# Patient Record
Sex: MALE | Race: WHITE | NOT HISPANIC OR LATINO | Employment: FULL TIME | URBAN - METROPOLITAN AREA
[De-identification: names, ages, dates, MRNs, and addresses within clinical notes are randomized per-mention and may not be internally consistent; named-entity substitution may affect disease eponyms.]

---

## 2017-02-15 ENCOUNTER — CONVERSION ENCOUNTER (OUTPATIENT)
Dept: FAMILY MEDICINE CLINIC | Facility: CLINIC | Age: 52
End: 2017-02-15

## 2017-02-15 ENCOUNTER — CONVERSION ENCOUNTER (OUTPATIENT)
Dept: OTHER | Facility: OTHER | Age: 52
End: 2017-02-15

## 2017-02-16 LAB
ALBUMIN SERPL-MCNC: 4.4 G/DL (ref 3.5–5.5)
ALBUMIN/GLOB SERPL: 1.7 {RATIO} (ref 1.1–2.5)
ALP SERPL-CCNC: 94 IU/L (ref 39–117)
ALT SERPL-CCNC: 79 IU/L (ref 0–44)
AST SERPL-CCNC: 50 IU/L (ref 0–40)
BILIRUB SERPL-MCNC: 0.6 MG/DL (ref 0–1.2)
BUN SERPL-MCNC: 16 MG/DL (ref 6–24)
BUN/CREAT SERPL: 14 (ref 9–20)
CALCIUM SERPL-MCNC: 9.3 MG/DL (ref 8.7–10.2)
CHLORIDE SERPL-SCNC: 103 MMOL/L (ref 96–106)
CHOLEST SERPL-MCNC: 175 MG/DL (ref 100–199)
CO2 SERPL-SCNC: 22 MMOL/L (ref 18–29)
CREAT SERPL-MCNC: 1.14 MG/DL (ref 0.76–1.27)
GLOBULIN SER CALC-MCNC: 2.6 G/DL (ref 1.5–4.5)
GLUCOSE SERPL-MCNC: 91 MG/DL (ref 65–99)
HDLC SERPL-MCNC: 43 MG/DL
LDLC SERPL CALC-MCNC: 111 MG/DL (ref 0–99)
POTASSIUM SERPL-SCNC: 4.7 MMOL/L (ref 3.5–5.2)
PROT SERPL-MCNC: 7 G/DL (ref 6–8.5)
SODIUM SERPL-SCNC: 142 MMOL/L (ref 134–144)
T4 FREE SERPL-MCNC: 1.27 NG/DL (ref 0.82–1.77)
TRIGL SERPL-MCNC: 107 MG/DL (ref 0–149)
TSH SERPL DL<=0.005 MIU/L-ACNC: 2.31 UIU/ML (ref 0.45–4.5)
VLDLC SERPL CALC-MCNC: 21 MG/DL (ref 5–40)

## 2017-02-17 ENCOUNTER — OFFICE VISIT (OUTPATIENT)
Dept: FAMILY MEDICINE CLINIC | Facility: CLINIC | Age: 52
End: 2017-02-17

## 2017-02-17 VITALS
SYSTOLIC BLOOD PRESSURE: 120 MMHG | BODY MASS INDEX: 32.98 KG/M2 | OXYGEN SATURATION: 98 % | HEIGHT: 74 IN | WEIGHT: 257 LBS | HEART RATE: 61 BPM | DIASTOLIC BLOOD PRESSURE: 70 MMHG | TEMPERATURE: 98.4 F | RESPIRATION RATE: 16 BRPM

## 2017-02-17 DIAGNOSIS — F33.42 RECURRENT MAJOR DEPRESSIVE DISORDER, IN FULL REMISSION (HCC): ICD-10-CM

## 2017-02-17 DIAGNOSIS — E03.9 ACQUIRED HYPOTHYROIDISM: Primary | ICD-10-CM

## 2017-02-17 DIAGNOSIS — F41.9 ANXIETY: ICD-10-CM

## 2017-02-17 DIAGNOSIS — E78.2 MIXED HYPERLIPIDEMIA: ICD-10-CM

## 2017-02-17 DIAGNOSIS — R79.89 LFT ELEVATION: ICD-10-CM

## 2017-02-17 PROCEDURE — 99214 OFFICE O/P EST MOD 30 MIN: CPT | Performed by: PHYSICIAN ASSISTANT

## 2017-02-17 RX ORDER — EZETIMIBE 10 MG/1
10 TABLET ORAL DAILY
Qty: 90 TABLET | Refills: 3 | Status: SHIPPED | OUTPATIENT
Start: 2017-02-17 | End: 2018-06-20 | Stop reason: SDUPTHER

## 2017-02-17 NOTE — PROGRESS NOTES
Subjective   Reynaldo Madden is a 51 y.o. male.     History of Present Illness   Reynaldo Madden 51 y.o. male who presents today for routine follow up check and medication refills.  he has a history of   Patient Active Problem List   Diagnosis   • Allergy   • Depression   • Erectile dysfunction   • Anxiety   • Hyperlipidemia   • Hypothyroidism   • Vitamin D deficiency disease   • SAMANTHA (generalized anxiety disorder)   .  Since the last visit, he has overall felt well.  He has hypothyroidism and is well controlled on Rx.  Labs are in desired treatment range.  he has been compliant with current medications have reviewed them.  The patient denies medication side effects.  TSH at goal 2.310 and free T4 1.27    LDL is 111 and goal is <100; need to add Zetia  CMP with normal renal functions but LFTs went up to ALT 79 and AST 50 and will order liver u/s.  His weight is down    Reynaldo Madden male 51 y.o. who presents today for follow up of Depression and Anxiety.  He reports medication is working well, patient desires to continue on Rx, and needs refill. Onset of symptoms was approximately several years ago.  He denies current suicidal and homicidal ideation. Risk factors are family history of anxiety and or depression and lifestyle of multiple roles.  Previous treatment includes current Rx.  He complains of the following medication side effects: none.  The patient has previously been in counseling..      The following portions of the patient's history were reviewed and updated as appropriate: allergies, current medications, past family history, past medical history, past social history, past surgical history and problem list.    Review of Systems   Constitutional: Negative for activity change, appetite change and unexpected weight change.   HENT: Negative for nosebleeds and trouble swallowing.    Eyes: Negative for pain and visual disturbance.   Respiratory: Negative for chest tightness, shortness of breath and wheezing.     Cardiovascular: Negative for chest pain and palpitations.   Gastrointestinal: Negative for abdominal pain and blood in stool.   Endocrine: Negative.    Genitourinary: Negative for difficulty urinating and hematuria.   Musculoskeletal: Negative for joint swelling.   Skin: Negative for color change and rash.   Allergic/Immunologic: Negative.    Neurological: Negative for syncope and speech difficulty.   Hematological: Negative for adenopathy.   Psychiatric/Behavioral: Negative for agitation and confusion.   All other systems reviewed and are negative.      Objective   Physical Exam   Constitutional: He is oriented to person, place, and time. He appears well-developed and well-nourished. No distress.   HENT:   Head: Normocephalic and atraumatic.   Eyes: Conjunctivae and EOM are normal. Pupils are equal, round, and reactive to light. Right eye exhibits no discharge. Left eye exhibits no discharge. No scleral icterus.   Neck: Normal range of motion. Neck supple. No tracheal deviation present. No thyromegaly present.   Cardiovascular: Normal rate, regular rhythm, normal heart sounds, intact distal pulses and normal pulses.  Exam reveals no gallop.    No murmur heard.  Pulmonary/Chest: Effort normal and breath sounds normal. No respiratory distress. He has no wheezes. He has no rales.   Musculoskeletal: Normal range of motion.   Neurological: He is alert and oriented to person, place, and time. He exhibits normal muscle tone. Coordination normal.   Skin: Skin is warm. No rash noted. No erythema. No pallor.   Psychiatric: He has a normal mood and affect. His behavior is normal. Judgment and thought content normal.   Nursing note and vitals reviewed.      Assessment/Plan   Reynaldo was seen today for hyperlipidemia.    Diagnoses and all orders for this visit:    Acquired hypothyroidism    Recurrent major depressive disorder, in full remission    Anxiety    Mixed hyperlipidemia    LFT elevation    Other orders  -      ezetimibe (ZETIA) 10 MG tablet; Take 1 tablet by mouth Daily. For cholesterol

## 2017-02-17 NOTE — PATIENT INSTRUCTIONS
Will add on acute hepatitis profile  Ordered liver ultrasound  Labs 3mos  Add Zetia for cholesterol  Contact office if your depression worsens   Go to ER if start to develop feelings for suicide  Take medication as prescribed  If you are having trouble tolerating your medication, contact office before abruptly stopping it

## 2017-02-21 LAB
HAV IGM SERPL QL IA: NEGATIVE
HBV CORE IGM SERPL QL IA: NEGATIVE
HBV SURFACE AG SERPL QL IA: NEGATIVE
HCV AB S/CO SERPL IA: <0.1 S/CO RATIO (ref 0–0.9)
WRITTEN AUTHORIZATION: NORMAL

## 2017-02-25 ENCOUNTER — HOSPITAL ENCOUNTER (OUTPATIENT)
Dept: ULTRASOUND IMAGING | Facility: HOSPITAL | Age: 52
Discharge: HOME | End: 2017-02-25
Admitting: PHYSICIAN ASSISTANT

## 2017-02-25 DIAGNOSIS — R79.89 LFT ELEVATION: ICD-10-CM

## 2017-02-25 PROCEDURE — 76705 ECHO EXAM OF ABDOMEN: CPT

## 2017-03-20 ENCOUNTER — TELEPHONE (OUTPATIENT)
Dept: FAMILY MEDICINE CLINIC | Facility: CLINIC | Age: 52
End: 2017-03-20

## 2017-03-20 DIAGNOSIS — E78.49 OTHER HYPERLIPIDEMIA: Primary | ICD-10-CM

## 2017-03-20 RX ORDER — ROSUVASTATIN CALCIUM 20 MG/1
TABLET, FILM COATED ORAL
Qty: 90 TABLET | OUTPATIENT
Start: 2017-03-20

## 2017-03-20 NOTE — TELEPHONE ENCOUNTER
Pt is calling inquiring if he should cont the cholesrerol medication with his elevated liver function and the u/s is normal. Please advice.

## 2017-05-16 ENCOUNTER — OFFICE (AMBULATORY)
Dept: URBAN - METROPOLITAN AREA CLINIC 75 | Facility: CLINIC | Age: 52
End: 2017-05-16

## 2017-05-16 VITALS
SYSTOLIC BLOOD PRESSURE: 130 MMHG | HEIGHT: 74 IN | DIASTOLIC BLOOD PRESSURE: 70 MMHG | WEIGHT: 256 LBS | HEART RATE: 64 BPM

## 2017-05-16 DIAGNOSIS — R94.5 ABNORMAL RESULTS OF LIVER FUNCTION STUDIES: ICD-10-CM

## 2017-05-16 DIAGNOSIS — Z12.11 ENCOUNTER FOR SCREENING FOR MALIGNANT NEOPLASM OF COLON: ICD-10-CM

## 2017-05-16 PROCEDURE — 99204 OFFICE O/P NEW MOD 45 MIN: CPT | Performed by: INTERNAL MEDICINE

## 2017-05-17 ENCOUNTER — RESULTS ENCOUNTER (OUTPATIENT)
Dept: FAMILY MEDICINE CLINIC | Facility: CLINIC | Age: 52
End: 2017-05-17

## 2017-05-17 DIAGNOSIS — E78.2 MIXED HYPERLIPIDEMIA: ICD-10-CM

## 2017-05-17 DIAGNOSIS — F41.9 ANXIETY: ICD-10-CM

## 2017-05-17 DIAGNOSIS — F33.42 RECURRENT MAJOR DEPRESSIVE DISORDER, IN FULL REMISSION (HCC): ICD-10-CM

## 2017-05-17 DIAGNOSIS — R79.89 LFT ELEVATION: ICD-10-CM

## 2017-05-17 DIAGNOSIS — E03.9 ACQUIRED HYPOTHYROIDISM: ICD-10-CM

## 2017-06-21 VITALS
HEART RATE: 56 BPM | HEART RATE: 55 BPM | SYSTOLIC BLOOD PRESSURE: 132 MMHG | SYSTOLIC BLOOD PRESSURE: 131 MMHG | RESPIRATION RATE: 18 BRPM | RESPIRATION RATE: 10 BRPM | DIASTOLIC BLOOD PRESSURE: 71 MMHG | HEART RATE: 52 BPM | HEART RATE: 57 BPM | HEART RATE: 70 BPM | DIASTOLIC BLOOD PRESSURE: 67 MMHG | DIASTOLIC BLOOD PRESSURE: 79 MMHG | OXYGEN SATURATION: 100 % | RESPIRATION RATE: 25 BRPM | SYSTOLIC BLOOD PRESSURE: 123 MMHG | OXYGEN SATURATION: 96 % | DIASTOLIC BLOOD PRESSURE: 76 MMHG | OXYGEN SATURATION: 95 % | TEMPERATURE: 96.7 F | RESPIRATION RATE: 27 BRPM | RESPIRATION RATE: 24 BRPM | SYSTOLIC BLOOD PRESSURE: 104 MMHG | DIASTOLIC BLOOD PRESSURE: 68 MMHG | HEIGHT: 74 IN | SYSTOLIC BLOOD PRESSURE: 120 MMHG | HEART RATE: 63 BPM | DIASTOLIC BLOOD PRESSURE: 75 MMHG | DIASTOLIC BLOOD PRESSURE: 73 MMHG | RESPIRATION RATE: 22 BRPM | OXYGEN SATURATION: 92 % | OXYGEN SATURATION: 98 % | RESPIRATION RATE: 16 BRPM | DIASTOLIC BLOOD PRESSURE: 77 MMHG | OXYGEN SATURATION: 97 % | SYSTOLIC BLOOD PRESSURE: 124 MMHG | HEART RATE: 61 BPM | SYSTOLIC BLOOD PRESSURE: 125 MMHG | SYSTOLIC BLOOD PRESSURE: 129 MMHG | HEART RATE: 64 BPM | RESPIRATION RATE: 14 BRPM | HEART RATE: 60 BPM | WEIGHT: 256 LBS | TEMPERATURE: 98 F | SYSTOLIC BLOOD PRESSURE: 107 MMHG | DIASTOLIC BLOOD PRESSURE: 63 MMHG

## 2017-06-22 ENCOUNTER — OFFICE (AMBULATORY)
Dept: URBAN - METROPOLITAN AREA CLINIC 64 | Facility: CLINIC | Age: 52
End: 2017-06-22

## 2017-06-22 ENCOUNTER — AMBULATORY SURGICAL CENTER (AMBULATORY)
Dept: URBAN - METROPOLITAN AREA SURGERY 17 | Facility: SURGERY | Age: 52
End: 2017-06-22
Payer: OTHER GOVERNMENT

## 2017-06-22 DIAGNOSIS — K63.5 POLYP OF COLON: ICD-10-CM

## 2017-06-22 DIAGNOSIS — Z12.11 ENCOUNTER FOR SCREENING FOR MALIGNANT NEOPLASM OF COLON: ICD-10-CM

## 2017-06-22 DIAGNOSIS — D12.4 BENIGN NEOPLASM OF DESCENDING COLON: ICD-10-CM

## 2017-06-22 LAB
GI HISTOLOGY: A. UNSPECIFIED: (no result)
GI HISTOLOGY: PDF REPORT: (no result)

## 2017-06-22 PROCEDURE — 45385 COLONOSCOPY W/LESION REMOVAL: CPT | Mod: PT | Performed by: INTERNAL MEDICINE

## 2017-06-22 PROCEDURE — 88305 TISSUE EXAM BY PATHOLOGIST: CPT | Performed by: INTERNAL MEDICINE

## 2017-06-22 RX ADMIN — PROPOFOL 50 MG: 10 INJECTION, EMULSION INTRAVENOUS at 11:31

## 2017-06-22 RX ADMIN — PROPOFOL 50 MG: 10 INJECTION, EMULSION INTRAVENOUS at 11:22

## 2017-06-22 RX ADMIN — LIDOCAINE HYDROCHLORIDE 25 MG: 10 INJECTION, SOLUTION EPIDURAL; INFILTRATION; INTRACAUDAL; PERINEURAL at 11:19

## 2017-06-22 RX ADMIN — PROPOFOL 50 MG: 10 INJECTION, EMULSION INTRAVENOUS at 11:28

## 2017-06-22 RX ADMIN — PROPOFOL 100 MG: 10 INJECTION, EMULSION INTRAVENOUS at 11:19

## 2017-06-22 RX ADMIN — PROPOFOL 50 MG: 10 INJECTION, EMULSION INTRAVENOUS at 11:33

## 2017-06-22 RX ADMIN — PROPOFOL 50 MG: 10 INJECTION, EMULSION INTRAVENOUS at 11:20

## 2017-07-18 ENCOUNTER — OFFICE (AMBULATORY)
Dept: URBAN - METROPOLITAN AREA CLINIC 75 | Facility: CLINIC | Age: 52
End: 2017-07-18

## 2017-07-18 VITALS
SYSTOLIC BLOOD PRESSURE: 120 MMHG | HEIGHT: 74 IN | HEART RATE: 80 BPM | WEIGHT: 260 LBS | DIASTOLIC BLOOD PRESSURE: 70 MMHG

## 2017-07-18 DIAGNOSIS — R94.5 ABNORMAL RESULTS OF LIVER FUNCTION STUDIES: ICD-10-CM

## 2017-07-18 PROCEDURE — 99213 OFFICE O/P EST LOW 20 MIN: CPT

## 2017-08-29 ENCOUNTER — OFFICE VISIT (OUTPATIENT)
Dept: FAMILY MEDICINE CLINIC | Facility: CLINIC | Age: 52
End: 2017-08-29

## 2017-08-29 VITALS
HEART RATE: 60 BPM | RESPIRATION RATE: 16 BRPM | SYSTOLIC BLOOD PRESSURE: 114 MMHG | OXYGEN SATURATION: 95 % | WEIGHT: 265 LBS | DIASTOLIC BLOOD PRESSURE: 72 MMHG | BODY MASS INDEX: 34.01 KG/M2 | HEIGHT: 74 IN | TEMPERATURE: 97.8 F

## 2017-08-29 DIAGNOSIS — F41.1 GAD (GENERALIZED ANXIETY DISORDER): ICD-10-CM

## 2017-08-29 DIAGNOSIS — E03.9 ACQUIRED HYPOTHYROIDISM: ICD-10-CM

## 2017-08-29 DIAGNOSIS — E55.9 VITAMIN D DEFICIENCY DISEASE: ICD-10-CM

## 2017-08-29 DIAGNOSIS — F33.42 RECURRENT MAJOR DEPRESSIVE DISORDER, IN FULL REMISSION (HCC): ICD-10-CM

## 2017-08-29 DIAGNOSIS — B00.1 FEVER BLISTER: ICD-10-CM

## 2017-08-29 DIAGNOSIS — E78.2 MIXED HYPERLIPIDEMIA: Primary | ICD-10-CM

## 2017-08-29 DIAGNOSIS — F41.9 ANXIETY: ICD-10-CM

## 2017-08-29 DIAGNOSIS — R79.89 LFT ELEVATION: ICD-10-CM

## 2017-08-29 LAB
ALBUMIN SERPL-MCNC: 4.2 G/DL (ref 3.5–5.5)
ALBUMIN/GLOB SERPL: 1.5 {RATIO} (ref 1.2–2.2)
ALP SERPL-CCNC: 74 IU/L (ref 39–117)
ALT SERPL-CCNC: 20 IU/L (ref 0–44)
AST SERPL-CCNC: 18 IU/L (ref 0–40)
BILIRUB SERPL-MCNC: 0.8 MG/DL (ref 0–1.2)
BUN SERPL-MCNC: 17 MG/DL (ref 6–24)
BUN/CREAT SERPL: 14 (ref 9–20)
CALCIUM SERPL-MCNC: 9.2 MG/DL (ref 8.7–10.2)
CHLORIDE SERPL-SCNC: 104 MMOL/L (ref 96–106)
CHOLEST SERPL-MCNC: 227 MG/DL (ref 100–199)
CO2 SERPL-SCNC: 21 MMOL/L (ref 18–29)
CREAT SERPL-MCNC: 1.24 MG/DL (ref 0.76–1.27)
GLOBULIN SER CALC-MCNC: 2.8 G/DL (ref 1.5–4.5)
GLUCOSE SERPL-MCNC: 91 MG/DL (ref 65–99)
HDLC SERPL-MCNC: 43 MG/DL
LDLC SERPL CALC-MCNC: 162 MG/DL (ref 0–99)
POTASSIUM SERPL-SCNC: 4.6 MMOL/L (ref 3.5–5.2)
PROT SERPL-MCNC: 7 G/DL (ref 6–8.5)
SODIUM SERPL-SCNC: 141 MMOL/L (ref 134–144)
TRIGL SERPL-MCNC: 110 MG/DL (ref 0–149)
VLDLC SERPL CALC-MCNC: 22 MG/DL (ref 5–40)

## 2017-08-29 PROCEDURE — 99213 OFFICE O/P EST LOW 20 MIN: CPT | Performed by: PHYSICIAN ASSISTANT

## 2017-08-29 RX ORDER — ESCITALOPRAM OXALATE 20 MG/1
20 TABLET ORAL DAILY
Qty: 90 TABLET | Refills: 3 | Status: SHIPPED | OUTPATIENT
Start: 2017-08-29 | End: 2018-10-11 | Stop reason: SDUPTHER

## 2017-08-29 RX ORDER — VALACYCLOVIR HYDROCHLORIDE 1 G/1
TABLET, FILM COATED ORAL
Qty: 20 TABLET | Refills: 5 | Status: SHIPPED | OUTPATIENT
Start: 2017-08-29 | End: 2019-01-24

## 2017-08-29 RX ORDER — LEVOTHYROXINE SODIUM 0.07 MG/1
75 TABLET ORAL DAILY
Qty: 90 TABLET | Refills: 3 | Status: SHIPPED | OUTPATIENT
Start: 2017-08-29 | End: 2018-07-24 | Stop reason: SDUPTHER

## 2017-08-29 NOTE — PROGRESS NOTES
Subjective   Reynaldo Madden is a 51 y.o. male.     History of Present Illness   Reynaldo Madden 51 y.o. male who presents today for routine follow up check and medication refills.  he has a history of   Patient Active Problem List   Diagnosis   • Allergy   • Depression   • Erectile dysfunction   • Anxiety   • Hyperlipidemia   • Hypothyroidism   • Vitamin D deficiency disease   • SAMANTHA (generalized anxiety disorder)   .  Since the last visit, he has overall felt tired.  He has Hyperlipidemia and here to discuss treatment and hypothyroidism and is well controlled on Rx.  Labs are in desired treatment range.  he has been compliant with current medications have reviewed them.  The patient denies medication side effects.    I had to stop Crestor and LFTs now normal;  Renal functions now in range  Lab Results   Component Value Date    TSH 2.310 02/15/2017     Reynaldo Madden male 51 y.o. who presents today for follow up of Depression and Anxiety.  He reports medication is working and just many recent life stresses. Onset of symptoms was approximately several years ago.  He denies current suicidal and homicidal ideation. Risk factors are family history of anxiety and or depression and lifestyle of multiple roles.  Previous treatment includes current Rx.  He complains of the following medication side effects: none.  The patient has previously been in counseling..    Less exercise and need to work on diet; weight is up  Lipids are up also    Dr Ladd did colonoscopy March and repeat 10 years  Fever blisters more often with stress and will Rx Valtrex    The following portions of the patient's history were reviewed and updated as appropriate: allergies, current medications, past family history, past medical history, past social history, past surgical history and problem list.    Review of Systems   Constitutional: Positive for fatigue and unexpected weight change. Negative for activity change and appetite change.   HENT: Negative  for nosebleeds and trouble swallowing.    Eyes: Negative for pain and visual disturbance.   Respiratory: Negative for chest tightness, shortness of breath and wheezing.    Cardiovascular: Negative for chest pain and palpitations.   Gastrointestinal: Negative for abdominal pain and blood in stool.   Endocrine: Negative.    Genitourinary: Negative for difficulty urinating and hematuria.   Musculoskeletal: Negative for joint swelling.   Skin: Negative for color change and rash.   Allergic/Immunologic: Negative.    Neurological: Negative for syncope and speech difficulty.   Hematological: Negative for adenopathy.   Psychiatric/Behavioral: Negative for agitation and confusion. The patient is nervous/anxious.    All other systems reviewed and are negative.      Objective   Physical Exam   Constitutional: He is oriented to person, place, and time. He appears well-developed and well-nourished. No distress.   HENT:   Head: Normocephalic and atraumatic.   Eyes: Conjunctivae and EOM are normal. Pupils are equal, round, and reactive to light. Right eye exhibits no discharge. Left eye exhibits no discharge. No scleral icterus.   Neck: Normal range of motion. Neck supple. No tracheal deviation present. No thyromegaly present.   Cardiovascular: Normal rate, regular rhythm, normal heart sounds, intact distal pulses and normal pulses.  Exam reveals no gallop.    No murmur heard.  Pulmonary/Chest: Effort normal and breath sounds normal. No respiratory distress. He has no wheezes. He has no rales.   Musculoskeletal: Normal range of motion.   Neurological: He is alert and oriented to person, place, and time. He exhibits normal muscle tone. Coordination normal.   Skin: Skin is warm. No rash noted. No erythema. No pallor.   Healing fever blister lower lip   Psychiatric: He has a normal mood and affect. His behavior is normal. Judgment and thought content normal.   Nursing note and vitals reviewed.      Assessment/Plan   Problems  Addressed this Visit        Cardiovascular and Mediastinum    Hyperlipidemia - Primary    Relevant Orders    Comprehensive metabolic panel    Lipid panel    CBC and Differential    TSH    PSA    T4, Free    Vitamin D 25 Hydroxy       Digestive    Vitamin D deficiency disease    Relevant Orders    Comprehensive metabolic panel    Lipid panel    CBC and Differential    TSH    PSA    T4, Free    Vitamin D 25 Hydroxy       Endocrine    Hypothyroidism    Relevant Medications    levothyroxine (SYNTHROID, LEVOTHROID) 75 MCG tablet    Other Relevant Orders    Comprehensive metabolic panel    Lipid panel    CBC and Differential    TSH    PSA    T4, Free    Vitamin D 25 Hydroxy       Other    Depression    Relevant Medications    escitalopram (LEXAPRO) 20 MG tablet    Other Relevant Orders    Comprehensive metabolic panel    Lipid panel    CBC and Differential    TSH    PSA    T4, Free    Vitamin D 25 Hydroxy    Anxiety    Relevant Orders    Comprehensive metabolic panel    Lipid panel    CBC and Differential    TSH    PSA    T4, Free    Vitamin D 25 Hydroxy    SAMANTHA (generalized anxiety disorder)    Relevant Medications    escitalopram (LEXAPRO) 20 MG tablet    Other Relevant Orders    Comprehensive metabolic panel    Lipid panel    CBC and Differential    TSH    PSA    T4, Free    Vitamin D 25 Hydroxy    RESOLVED: LFT elevation    Relevant Orders    Comprehensive metabolic panel    Lipid panel    CBC and Differential    TSH    PSA    T4, Free    Vitamin D 25 Hydroxy      Other Visit Diagnoses     Fever blister        Relevant Medications    valACYclovir (VALTREX) 1000 MG tablet

## 2017-08-29 NOTE — PATIENT INSTRUCTIONS
Low glycemic index diet  Exercise 30 minutes most days of the week  Make sure you get results on any labs or tests we ordered today  We discussed medications and how to take them as prescribed  Sleep 6-8 hours each night if possible  If you have not signed up for [employer], please activate your code ASAP from your After Visit Summary today    LDL goal <100  LDL goal if heart disease <70  HDL goal >60  Triglyceride goal <150  BP goal =<130/80  Fasting glucose <100    Labs in Feb  Can consider trial statin if lipids stay high and monitor liver functions

## 2018-02-01 ENCOUNTER — RESULTS ENCOUNTER (OUTPATIENT)
Dept: FAMILY MEDICINE CLINIC | Facility: CLINIC | Age: 53
End: 2018-02-01

## 2018-02-01 DIAGNOSIS — E78.2 MIXED HYPERLIPIDEMIA: ICD-10-CM

## 2018-02-01 DIAGNOSIS — F41.9 ANXIETY: ICD-10-CM

## 2018-02-01 DIAGNOSIS — F41.1 GAD (GENERALIZED ANXIETY DISORDER): ICD-10-CM

## 2018-02-01 DIAGNOSIS — F33.42 RECURRENT MAJOR DEPRESSIVE DISORDER, IN FULL REMISSION (HCC): ICD-10-CM

## 2018-02-01 DIAGNOSIS — R79.89 LFT ELEVATION: ICD-10-CM

## 2018-02-01 DIAGNOSIS — E03.9 ACQUIRED HYPOTHYROIDISM: ICD-10-CM

## 2018-02-01 DIAGNOSIS — E55.9 VITAMIN D DEFICIENCY DISEASE: ICD-10-CM

## 2018-06-20 RX ORDER — EZETIMIBE 10 MG/1
10 TABLET ORAL DAILY
Qty: 90 TABLET | Refills: 0 | Status: SHIPPED | OUTPATIENT
Start: 2018-06-20 | End: 2018-07-24 | Stop reason: SDUPTHER

## 2018-06-20 NOTE — TELEPHONE ENCOUNTER
Pt has been out of zetia for about one month. I refilled it until his appt. And told him to have his labs done

## 2018-06-26 ENCOUNTER — OFFICE VISIT (OUTPATIENT)
Dept: FAMILY MEDICINE CLINIC | Facility: CLINIC | Age: 53
End: 2018-06-26

## 2018-06-26 VITALS
HEIGHT: 74 IN | WEIGHT: 266 LBS | RESPIRATION RATE: 16 BRPM | TEMPERATURE: 98.1 F | HEART RATE: 70 BPM | SYSTOLIC BLOOD PRESSURE: 110 MMHG | DIASTOLIC BLOOD PRESSURE: 70 MMHG | OXYGEN SATURATION: 98 % | BODY MASS INDEX: 34.14 KG/M2

## 2018-06-26 DIAGNOSIS — F41.1 GAD (GENERALIZED ANXIETY DISORDER): ICD-10-CM

## 2018-06-26 DIAGNOSIS — E03.9 ACQUIRED HYPOTHYROIDISM: Primary | ICD-10-CM

## 2018-06-26 DIAGNOSIS — F33.42 RECURRENT MAJOR DEPRESSIVE DISORDER, IN FULL REMISSION (HCC): ICD-10-CM

## 2018-06-26 DIAGNOSIS — R79.89 ABNORMAL SERUM CREATININE LEVEL: ICD-10-CM

## 2018-06-26 DIAGNOSIS — E55.9 VITAMIN D DEFICIENCY DISEASE: ICD-10-CM

## 2018-06-26 DIAGNOSIS — E78.2 MIXED HYPERLIPIDEMIA: ICD-10-CM

## 2018-06-26 DIAGNOSIS — F41.9 ANXIETY: ICD-10-CM

## 2018-06-26 LAB
25(OH)D3+25(OH)D2 SERPL-MCNC: 16.9 NG/ML (ref 30–100)
ALBUMIN SERPL-MCNC: 4.3 G/DL (ref 3.5–5.5)
ALBUMIN/GLOB SERPL: 1.5 {RATIO} (ref 1.2–2.2)
ALP SERPL-CCNC: 91 IU/L (ref 39–117)
ALT SERPL-CCNC: 20 IU/L (ref 0–44)
AST SERPL-CCNC: 22 IU/L (ref 0–40)
BASOPHILS # BLD AUTO: 0 X10E3/UL (ref 0–0.2)
BASOPHILS NFR BLD AUTO: 0 %
BILIRUB SERPL-MCNC: 0.6 MG/DL (ref 0–1.2)
BUN SERPL-MCNC: 16 MG/DL (ref 6–24)
BUN/CREAT SERPL: 12 (ref 9–20)
CALCIUM SERPL-MCNC: 9.5 MG/DL (ref 8.7–10.2)
CHLORIDE SERPL-SCNC: 104 MMOL/L (ref 96–106)
CHOLEST SERPL-MCNC: 286 MG/DL (ref 100–199)
CO2 SERPL-SCNC: 22 MMOL/L (ref 20–29)
CREAT SERPL-MCNC: 1.34 MG/DL (ref 0.76–1.27)
EOSINOPHIL # BLD AUTO: 0.2 X10E3/UL (ref 0–0.4)
EOSINOPHIL NFR BLD AUTO: 3 %
ERYTHROCYTE [DISTWIDTH] IN BLOOD BY AUTOMATED COUNT: 14 % (ref 12.3–15.4)
GFR SERPLBLD CREATININE-BSD FMLA CKD-EPI: 60 ML/MIN/1.73
GFR SERPLBLD CREATININE-BSD FMLA CKD-EPI: 70 ML/MIN/1.73
GLOBULIN SER CALC-MCNC: 2.9 G/DL (ref 1.5–4.5)
GLUCOSE SERPL-MCNC: 88 MG/DL (ref 65–99)
HCT VFR BLD AUTO: 44.4 % (ref 37.5–51)
HDLC SERPL-MCNC: 46 MG/DL
HGB BLD-MCNC: 15.1 G/DL (ref 13–17.7)
IMM GRANULOCYTES # BLD: 0 X10E3/UL (ref 0–0.1)
IMM GRANULOCYTES NFR BLD: 0 %
LABORATORY COMMENT REPORT: ABNORMAL
LDLC SERPL CALC-MCNC: 220 MG/DL (ref 0–99)
LYMPHOCYTES # BLD AUTO: 2.1 X10E3/UL (ref 0.7–3.1)
LYMPHOCYTES NFR BLD AUTO: 38 %
MCH RBC QN AUTO: 31.1 PG (ref 26.6–33)
MCHC RBC AUTO-ENTMCNC: 34 G/DL (ref 31.5–35.7)
MCV RBC AUTO: 91 FL (ref 79–97)
MONOCYTES # BLD AUTO: 0.4 X10E3/UL (ref 0.1–0.9)
MONOCYTES NFR BLD AUTO: 8 %
NEUTROPHILS # BLD AUTO: 2.8 X10E3/UL (ref 1.4–7)
NEUTROPHILS NFR BLD AUTO: 51 %
PLATELET # BLD AUTO: 252 X10E3/UL (ref 150–379)
POTASSIUM SERPL-SCNC: 4.5 MMOL/L (ref 3.5–5.2)
PROT SERPL-MCNC: 7.2 G/DL (ref 6–8.5)
PSA SERPL-MCNC: 2 NG/ML (ref 0–4)
RBC # BLD AUTO: 4.86 X10E6/UL (ref 4.14–5.8)
SODIUM SERPL-SCNC: 140 MMOL/L (ref 134–144)
T4 FREE SERPL-MCNC: 1.5 NG/DL (ref 0.82–1.77)
TRIGL SERPL-MCNC: 98 MG/DL (ref 0–149)
TSH SERPL DL<=0.005 MIU/L-ACNC: 4.62 UIU/ML (ref 0.45–4.5)
VLDLC SERPL CALC-MCNC: 20 MG/DL (ref 5–40)
WBC # BLD AUTO: 5.5 X10E3/UL (ref 3.4–10.8)

## 2018-06-26 PROCEDURE — 99214 OFFICE O/P EST MOD 30 MIN: CPT | Performed by: PHYSICIAN ASSISTANT

## 2018-06-26 NOTE — PATIENT INSTRUCTIONS
Low glycemic index diet  Exercise 30 minutes most days of the week  Make sure you get results on any labs or tests we ordered today  We discussed medications and how to take them as prescribed  Sleep 6-8 hours each night if possible  If you have not signed up for [employer], please activate your code ASAP from your After Visit Summary today    LDL goal <100  LDL goal if heart disease <70  HDL goal >60  Triglyceride goal <150  BP goal =<130/80  Fasting glucose <100    Contact office if your depression worsens   Go to ER if start to develop feelings for suicide  Take medication as prescribed  If you are having trouble tolerating your medication, contact office before abruptly stopping it

## 2018-06-27 LAB
Lab: NORMAL
T3FREE SERPL-MCNC: 2.8 PG/ML (ref 2–4.4)
WRITTEN AUTHORIZATION: NORMAL

## 2018-07-24 ENCOUNTER — OFFICE VISIT (OUTPATIENT)
Dept: FAMILY MEDICINE CLINIC | Facility: CLINIC | Age: 53
End: 2018-07-24

## 2018-07-24 VITALS
HEIGHT: 74 IN | OXYGEN SATURATION: 94 % | WEIGHT: 265 LBS | SYSTOLIC BLOOD PRESSURE: 120 MMHG | BODY MASS INDEX: 34.01 KG/M2 | RESPIRATION RATE: 16 BRPM | HEART RATE: 60 BPM | DIASTOLIC BLOOD PRESSURE: 70 MMHG | TEMPERATURE: 97.6 F

## 2018-07-24 DIAGNOSIS — F33.42 RECURRENT MAJOR DEPRESSIVE DISORDER, IN FULL REMISSION (HCC): ICD-10-CM

## 2018-07-24 DIAGNOSIS — E03.9 ACQUIRED HYPOTHYROIDISM: ICD-10-CM

## 2018-07-24 DIAGNOSIS — F41.9 ANXIETY: ICD-10-CM

## 2018-07-24 DIAGNOSIS — E78.2 MIXED HYPERLIPIDEMIA: Primary | ICD-10-CM

## 2018-07-24 DIAGNOSIS — E55.9 VITAMIN D DEFICIENCY DISEASE: ICD-10-CM

## 2018-07-24 LAB
25(OH)D3+25(OH)D2 SERPL-MCNC: 17 NG/ML (ref 30–100)
BUN SERPL-MCNC: 17 MG/DL (ref 6–24)
BUN/CREAT SERPL: 12 (ref 9–20)
CALCIUM SERPL-MCNC: 9.8 MG/DL (ref 8.7–10.2)
CHLORIDE SERPL-SCNC: 105 MMOL/L (ref 96–106)
CHOLEST SERPL-MCNC: 224 MG/DL (ref 100–199)
CO2 SERPL-SCNC: 22 MMOL/L (ref 20–29)
CREAT SERPL-MCNC: 1.47 MG/DL (ref 0.76–1.27)
GLUCOSE SERPL-MCNC: 90 MG/DL (ref 65–99)
HDLC SERPL-MCNC: 39 MG/DL
LDLC SERPL CALC-MCNC: 168 MG/DL (ref 0–99)
POTASSIUM SERPL-SCNC: 4.9 MMOL/L (ref 3.5–5.2)
SODIUM SERPL-SCNC: 139 MMOL/L (ref 134–144)
T3FREE SERPL-MCNC: 2.9 PG/ML (ref 2–4.4)
T4 FREE SERPL-MCNC: 1.54 NG/DL (ref 0.82–1.77)
TRIGL SERPL-MCNC: 86 MG/DL (ref 0–149)
TSH SERPL DL<=0.005 MIU/L-ACNC: 3.85 UIU/ML (ref 0.45–4.5)
VLDLC SERPL CALC-MCNC: 17 MG/DL (ref 5–40)

## 2018-07-24 PROCEDURE — 99214 OFFICE O/P EST MOD 30 MIN: CPT | Performed by: PHYSICIAN ASSISTANT

## 2018-07-24 RX ORDER — LEVOTHYROXINE SODIUM 0.07 MG/1
75 TABLET ORAL DAILY
Qty: 90 TABLET | Refills: 3 | Status: SHIPPED | OUTPATIENT
Start: 2018-07-24 | End: 2019-08-01 | Stop reason: SDUPTHER

## 2018-07-24 RX ORDER — EZETIMIBE 10 MG/1
10 TABLET ORAL DAILY
Qty: 90 TABLET | Refills: 3 | Status: SHIPPED | OUTPATIENT
Start: 2018-07-24 | End: 2019-08-01 | Stop reason: SDUPTHER

## 2018-07-24 NOTE — PROGRESS NOTES
Subjective   Reynaldo Madden is a 52 y.o. male.     History of Present Illness   Reynaldo Madden 52 y.o. male who presents today for routine follow up check and medication refills.  he has a history of   Patient Active Problem List   Diagnosis   • Allergy   • Depression   • Erectile dysfunction   • Hyperlipidemia   • Hypothyroidism   • Vitamin D deficiency disease   .  Since the last visit, he has overall felt well.  He has Hyperlipidemia and here to discuss treatment, Hypothyroidism and is well controlled on Rx.  Labs are in desired treatment range and Vitamin D deficiency and will update labs to confirm level is at goal >30.  he has been compliant with current medications have reviewed them.  The patient denies medication side effects.    Results for orders placed or performed in visit on 06/26/18   Lipid Panel   Result Value Ref Range    Total Cholesterol 224 (H) 100 - 199 mg/dL    Triglycerides 86 0 - 149 mg/dL    HDL Cholesterol 39 (L) >39 mg/dL    VLDL Cholesterol 17 5 - 40 mg/dL    LDL Cholesterol  168 (H) 0 - 99 mg/dL   Basic Metabolic Panel   Result Value Ref Range    Glucose 90 65 - 99 mg/dL    BUN 17 6 - 24 mg/dL    Creatinine 1.47 (H) 0.76 - 1.27 mg/dL    eGFR Non African Am 54 (L) >59 mL/min/1.73    eGFR African Am 63 >59 mL/min/1.73    BUN/Creatinine Ratio 12 9 - 20    Sodium 139 134 - 144 mmol/L    Potassium 4.9 3.5 - 5.2 mmol/L    Chloride 105 96 - 106 mmol/L    Total CO2 22 20 - 29 mmol/L    Calcium 9.8 8.7 - 10.2 mg/dL   T3, Free   Result Value Ref Range    T3, Free 2.9 2.0 - 4.4 pg/mL   T4, Free   Result Value Ref Range    Free T4 1.54 0.82 - 1.77 ng/dL   TSH   Result Value Ref Range    TSH 3.850 0.450 - 4.500 uIU/mL   Vitamin D 25 Hydroxy   Result Value Ref Range    25 Hydroxy, Vitamin D 17.0 (L) 30.0 - 100.0 ng/mL       He did resume taking Zetia and lipids improved.  I do not have him on a statin d/t it raised his LFTs!!!!  Went from 220 to 168.  Watching renal functions and avoid NSAIDs; thyroid  now at goal.  Labs show low Vitamin D levels.  I want you to add OTC Vitamin D 2,000 I.U. Daily.  I reviewed Lexapro and depression f/u last visit and PHQ-9 was 4 and goal is <5.  This is working well and stable.  Onset was 2011.  He did go to [facility] July 12 and 28, 2018 and he been seen prior 11-17-05;  Dr. LOUISE Cárdenas Ed.D. And RAGHAV Delaney Ed. Did eval.  Depression in remission.  He had them fill out his psych paperwork for [employer].    Prior PSA 1.7    The following portions of the patient's history were reviewed and updated as appropriate: allergies, current medications, past family history, past medical history, past social history, past surgical history and problem list.    Review of Systems   Constitutional: Negative for activity change, appetite change and unexpected weight change.   HENT: Negative for nosebleeds and trouble swallowing.    Eyes: Negative for pain and visual disturbance.   Respiratory: Negative for chest tightness, shortness of breath and wheezing.    Cardiovascular: Negative for chest pain and palpitations.   Gastrointestinal: Negative for abdominal pain and blood in stool.   Endocrine: Negative.    Genitourinary: Negative for difficulty urinating and hematuria.   Musculoskeletal: Negative for joint swelling.   Skin: Negative for color change and rash.   Allergic/Immunologic: Negative.    Neurological: Negative for syncope and speech difficulty.   Hematological: Negative for adenopathy.   Psychiatric/Behavioral: Negative for agitation, confusion and dysphoric mood.   All other systems reviewed and are negative.      Objective   Physical Exam   Constitutional: He is oriented to person, place, and time. He appears well-developed and well-nourished. No distress.   HENT:   Head: Normocephalic and atraumatic.   Eyes: Pupils are equal, round, and reactive to light. Conjunctivae and EOM are normal. Right eye exhibits no discharge. Left eye exhibits no discharge. No scleral icterus.   Neck:  Normal range of motion. Neck supple. No tracheal deviation present. No thyromegaly present.   Cardiovascular: Normal rate, regular rhythm, normal heart sounds, intact distal pulses and normal pulses.  Exam reveals no gallop.    No murmur heard.  Pulmonary/Chest: Effort normal and breath sounds normal. No respiratory distress. He has no wheezes. He has no rales.   Musculoskeletal: Normal range of motion.   Neurological: He is alert and oriented to person, place, and time. He exhibits normal muscle tone. Coordination normal.   Skin: Skin is warm. No rash noted. No erythema. No pallor.   Psychiatric: He has a normal mood and affect. His behavior is normal. Judgment and thought content normal.   Nursing note and vitals reviewed.      Assessment/Plan   Problems Addressed this Visit        Cardiovascular and Mediastinum    Hyperlipidemia - Primary    Relevant Medications    ezetimibe (ZETIA) 10 MG tablet       Digestive    Vitamin D deficiency disease       Endocrine    Hypothyroidism    Relevant Medications    levothyroxine (SYNTHROID, LEVOTHROID) 75 MCG tablet       Other    Depression    RESOLVED: Anxiety

## 2018-07-24 NOTE — PATIENT INSTRUCTIONS
Low glycemic index diet  Exercise 30 minutes most days of the week  Make sure you get results on any labs or tests we ordered today  We discussed medications and how to take them as prescribed  Sleep 6-8 hours each night if possible  If you have not signed up for [id number], please activate your code ASAP from your After Visit Summary today    LDL goal <100  LDL goal if heart disease <70  HDL goal >60  Triglyceride goal <150  BP goal =<130/80  Fasting glucose <100

## 2018-10-11 RX ORDER — ESCITALOPRAM OXALATE 20 MG/1
TABLET ORAL
Qty: 90 TABLET | Refills: 3 | Status: SHIPPED | OUTPATIENT
Start: 2018-10-11 | End: 2018-10-11 | Stop reason: SDUPTHER

## 2018-10-11 RX ORDER — ESCITALOPRAM OXALATE 20 MG/1
20 TABLET ORAL DAILY
Qty: 30 TABLET | Refills: 0 | Status: SHIPPED | OUTPATIENT
Start: 2018-10-11 | End: 2018-10-11 | Stop reason: SDUPTHER

## 2018-10-12 RX ORDER — ESCITALOPRAM OXALATE 20 MG/1
20 TABLET ORAL DAILY
Qty: 90 TABLET | Refills: 1 | Status: SHIPPED | OUTPATIENT
Start: 2018-10-12 | End: 2019-01-24 | Stop reason: SDUPTHER

## 2019-01-24 ENCOUNTER — RESULTS ENCOUNTER (OUTPATIENT)
Dept: FAMILY MEDICINE CLINIC | Facility: CLINIC | Age: 54
End: 2019-01-24

## 2019-01-24 ENCOUNTER — OFFICE VISIT (OUTPATIENT)
Dept: FAMILY MEDICINE CLINIC | Facility: CLINIC | Age: 54
End: 2019-01-24

## 2019-01-24 VITALS
SYSTOLIC BLOOD PRESSURE: 112 MMHG | HEART RATE: 62 BPM | HEIGHT: 74 IN | DIASTOLIC BLOOD PRESSURE: 64 MMHG | OXYGEN SATURATION: 98 % | BODY MASS INDEX: 33.88 KG/M2 | WEIGHT: 264 LBS

## 2019-01-24 DIAGNOSIS — E78.2 MIXED HYPERLIPIDEMIA: Primary | ICD-10-CM

## 2019-01-24 DIAGNOSIS — E03.9 ACQUIRED HYPOTHYROIDISM: ICD-10-CM

## 2019-01-24 DIAGNOSIS — E55.9 VITAMIN D DEFICIENCY DISEASE: ICD-10-CM

## 2019-01-24 DIAGNOSIS — F41.9 ANXIETY: ICD-10-CM

## 2019-01-24 DIAGNOSIS — E78.2 MIXED HYPERLIPIDEMIA: ICD-10-CM

## 2019-01-24 DIAGNOSIS — F33.42 RECURRENT MAJOR DEPRESSIVE DISORDER, IN FULL REMISSION (HCC): ICD-10-CM

## 2019-01-24 PROCEDURE — 99214 OFFICE O/P EST MOD 30 MIN: CPT | Performed by: PHYSICIAN ASSISTANT

## 2019-01-24 RX ORDER — ESCITALOPRAM OXALATE 20 MG/1
20 TABLET ORAL DAILY
Qty: 90 TABLET | Refills: 1 | Status: SHIPPED | OUTPATIENT
Start: 2019-01-24 | End: 2019-08-01 | Stop reason: SDUPTHER

## 2019-01-24 NOTE — PATIENT INSTRUCTIONS
Low glycemic index diet  Exercise 30 minutes most days of the week  Make sure you get results on any labs or tests we ordered today  We discussed medications and how to take them as prescribed  Sleep 6-8 hours each night if possible  If you have not signed up for [employer], please activate your code ASAP from your After Visit Summary today    LDL goal <100  LDL goal if heart disease <70  HDL goal >60  Triglyceride goal <150  BP goal =<130/80  Fasting glucose <100    Contact office if your depression worsens   Go to ER if start to develop feelings for suicide  Take medication as prescribed  If you are having trouble tolerating your medication, contact office before abruptly stopping it

## 2019-01-24 NOTE — PROGRESS NOTES
Subjective   Reynaldo Madden is a 53 y.o. male.     History of Present Illness   Reynaldo Madden 53 y.o. male who presents today for routine follow up check and medication refills.  he has a history of   Patient Active Problem List   Diagnosis   • Allergy   • Recurrent major depressive disorder, in full remission (CMS/[facility])   • Erectile dysfunction   • Hyperlipidemia   • Hypothyroidism   • Vitamin D deficiency disease   .  Since the last visit, he has overall felt well.  He has Hyperlipidemia and here to discuss treatment, Hypothyroidism and is well controlled on Rx.  Labs are in desired treatment range and Vitamin D deficiency and well controlled on medication and labs at goal >30.  he has been compliant with current medications have reviewed them.  The patient denies medication side effects.    Results for orders placed or performed in visit on 06/26/18   Lipid Panel   Result Value Ref Range    Total Cholesterol 224 (H) 100 - 199 mg/dL    Triglycerides 86 0 - 149 mg/dL    HDL Cholesterol 39 (L) >39 mg/dL    VLDL Cholesterol 17 5 - 40 mg/dL    LDL Cholesterol  168 (H) 0 - 99 mg/dL   Basic Metabolic Panel   Result Value Ref Range    Glucose 90 65 - 99 mg/dL    BUN 17 6 - 24 mg/dL    Creatinine 1.47 (H) 0.76 - 1.27 mg/dL    eGFR Non African Am 54 (L) >59 mL/min/1.73    eGFR African Am 63 >59 mL/min/1.73    BUN/Creatinine Ratio 12 9 - 20    Sodium 139 134 - 144 mmol/L    Potassium 4.9 3.5 - 5.2 mmol/L    Chloride 105 96 - 106 mmol/L    Total CO2 22 20 - 29 mmol/L    Calcium 9.8 8.7 - 10.2 mg/dL   T3, Free   Result Value Ref Range    T3, Free 2.9 2.0 - 4.4 pg/mL   T4, Free   Result Value Ref Range    Free T4 1.54 0.82 - 1.77 ng/dL   TSH   Result Value Ref Range    TSH 3.850 0.450 - 4.500 uIU/mL   Vitamin D 25 Hydroxy   Result Value Ref Range    25 Hydroxy, Vitamin D 17.0 (L) 30.0 - 100.0 ng/mL    reviewed Lexapro and depression f/u last visit and PHQ-9 was 4 and goal is <5.  This is working well and stable.  Onset was  2011.  He did go to [facility] July 12 and 28, 2018 and he been seen prior 11-17-05;  Dr. LOUISE Cárdenas Ed.D. And RAGHAV Delaney Ed. Did eval.  Depression in remission.  He had them fill out his psych paperwork for [employer]    Statins elevate his LFTs!!!!!  Not able to take statin; on Zetia  Reynaldo Madden male 53 y.o. who presents today for follow up of Depression.  He reports medication is working well, patient desires to continue on Rx, and needs refill. Onset of symptoms was approximately several years ago.  He denies current suicidal and homicidal ideation. Risk factors are family history of anxiety and or depression and lifestyle of multiple roles.  Previous treatment includes current Rx.  He complains of the following medication side effects: none.  The patient declines to go to counseling..      The following portions of the patient's history were reviewed and updated as appropriate: allergies, current medications, past family history, past medical history, past social history, past surgical history and problem list.    Review of Systems    Objective   Physical Exam   Constitutional: He is oriented to person, place, and time. He appears well-developed and well-nourished. No distress.   HENT:   Head: Normocephalic and atraumatic.   Eyes: Conjunctivae and EOM are normal. Pupils are equal, round, and reactive to light. Right eye exhibits no discharge. Left eye exhibits no discharge. No scleral icterus.   Neck: Normal range of motion. Neck supple. No tracheal deviation present. No thyromegaly present.   Cardiovascular: Normal rate, regular rhythm, normal heart sounds, intact distal pulses and normal pulses. Exam reveals no gallop.   No murmur heard.  Pulmonary/Chest: Effort normal and breath sounds normal. No respiratory distress. He has no wheezes. He has no rales.   Musculoskeletal: Normal range of motion.   Neurological: He is alert and oriented to person, place, and time. He exhibits normal muscle tone.  Coordination normal.   Skin: Skin is warm. No rash noted. No erythema. No pallor.   Psychiatric: He has a normal mood and affect. His behavior is normal. Judgment and thought content normal.   Nursing note and vitals reviewed.      Assessment/Plan   Problems Addressed this Visit        Cardiovascular and Mediastinum    Hyperlipidemia - Primary       Digestive    Vitamin D deficiency disease       Endocrine    Hypothyroidism       Other    Recurrent major depressive disorder, in full remission (CMS/HCC)               flu

## 2019-08-01 ENCOUNTER — OFFICE VISIT (OUTPATIENT)
Dept: FAMILY MEDICINE CLINIC | Facility: CLINIC | Age: 54
End: 2019-08-01

## 2019-08-01 VITALS
DIASTOLIC BLOOD PRESSURE: 80 MMHG | WEIGHT: 271 LBS | SYSTOLIC BLOOD PRESSURE: 120 MMHG | HEART RATE: 69 BPM | TEMPERATURE: 97.7 F | OXYGEN SATURATION: 94 % | HEIGHT: 74 IN | RESPIRATION RATE: 16 BRPM | BODY MASS INDEX: 34.78 KG/M2

## 2019-08-01 DIAGNOSIS — F33.42 RECURRENT MAJOR DEPRESSIVE DISORDER, IN FULL REMISSION (HCC): ICD-10-CM

## 2019-08-01 DIAGNOSIS — E78.2 MIXED HYPERLIPIDEMIA: ICD-10-CM

## 2019-08-01 DIAGNOSIS — Z00.00 LABORATORY EXAMINATION ORDERED AS PART OF A ROUTINE GENERAL MEDICAL EXAMINATION: ICD-10-CM

## 2019-08-01 DIAGNOSIS — E03.9 ACQUIRED HYPOTHYROIDISM: Primary | ICD-10-CM

## 2019-08-01 LAB
25(OH)D3+25(OH)D2 SERPL-MCNC: 15.5 NG/ML (ref 30–100)
ALBUMIN SERPL-MCNC: 4.2 G/DL (ref 3.5–5.5)
ALBUMIN/GLOB SERPL: 1.4 {RATIO} (ref 1.2–2.2)
ALP SERPL-CCNC: 84 IU/L (ref 39–117)
ALT SERPL-CCNC: 16 IU/L (ref 0–44)
AST SERPL-CCNC: 15 IU/L (ref 0–40)
BILIRUB SERPL-MCNC: 0.6 MG/DL (ref 0–1.2)
BUN SERPL-MCNC: 15 MG/DL (ref 6–24)
BUN/CREAT SERPL: 11 (ref 9–20)
CALCIUM SERPL-MCNC: 9.3 MG/DL (ref 8.7–10.2)
CHLORIDE SERPL-SCNC: 106 MMOL/L (ref 96–106)
CHOLEST SERPL-MCNC: 231 MG/DL (ref 100–199)
CO2 SERPL-SCNC: 19 MMOL/L (ref 20–29)
CREAT SERPL-MCNC: 1.32 MG/DL (ref 0.76–1.27)
GLOBULIN SER CALC-MCNC: 2.9 G/DL (ref 1.5–4.5)
GLUCOSE SERPL-MCNC: 85 MG/DL (ref 65–99)
HDLC SERPL-MCNC: 41 MG/DL
LDLC SERPL CALC-MCNC: 173 MG/DL (ref 0–99)
POTASSIUM SERPL-SCNC: 4.3 MMOL/L (ref 3.5–5.2)
PROT SERPL-MCNC: 7.1 G/DL (ref 6–8.5)
SODIUM SERPL-SCNC: 139 MMOL/L (ref 134–144)
T3FREE SERPL-MCNC: 2.4 PG/ML (ref 2–4.4)
T4 FREE SERPL-MCNC: 1.48 NG/DL (ref 0.82–1.77)
TRIGL SERPL-MCNC: 87 MG/DL (ref 0–149)
TSH SERPL DL<=0.005 MIU/L-ACNC: 3.68 UIU/ML (ref 0.45–4.5)
VLDLC SERPL CALC-MCNC: 17 MG/DL (ref 5–40)

## 2019-08-01 PROCEDURE — 99214 OFFICE O/P EST MOD 30 MIN: CPT | Performed by: PHYSICIAN ASSISTANT

## 2019-08-01 RX ORDER — LEVOTHYROXINE SODIUM 0.07 MG/1
75 TABLET ORAL DAILY
Qty: 90 TABLET | Refills: 3 | Status: SHIPPED | OUTPATIENT
Start: 2019-08-01 | End: 2020-10-30

## 2019-08-01 RX ORDER — ESCITALOPRAM OXALATE 20 MG/1
20 TABLET ORAL DAILY
Qty: 90 TABLET | Refills: 3 | Status: SHIPPED | OUTPATIENT
Start: 2019-08-01 | End: 2020-08-15

## 2019-08-01 RX ORDER — EZETIMIBE 10 MG/1
10 TABLET ORAL DAILY
Qty: 90 TABLET | Refills: 3 | Status: SHIPPED | OUTPATIENT
Start: 2019-08-01 | End: 2020-07-26

## 2019-08-01 NOTE — PATIENT INSTRUCTIONS

## 2019-08-01 NOTE — PROGRESS NOTES
Subjective   Reynaldo Madden is a 53 y.o. male.     History of Present Illness   Reynaldo Madden 53 y.o. male who presents today for routine follow up check and medication refills.  he has a history of   Patient Active Problem List   Diagnosis   • Allergy   • Recurrent major depressive disorder, in full remission (CMS/HCC)   • Erectile dysfunction   • Hyperlipidemia   • Hypothyroidism   • Vitamin D deficiency disease   .  Since the last visit, he has overall felt well.  He has Hyperlipidemia and here to discuss treatment, Hypothyroidism and is well controlled on Rx.  Labs are in desired treatment range and Vitamin D deficiency and well controlled on medication and labs at goal >30.  he has been compliant with current medications have reviewed them.  The patient denies medication side effects.    Results for orders placed or performed in visit on 01/24/19   Comprehensive metabolic panel   Result Value Ref Range    Glucose 85 65 - 99 mg/dL    BUN 15 6 - 24 mg/dL    Creatinine 1.32 (H) 0.76 - 1.27 mg/dL    eGFR Non African Am 61 >59 mL/min/1.73    eGFR African Am 71 >59 mL/min/1.73    BUN/Creatinine Ratio 11 9 - 20    Sodium 139 134 - 144 mmol/L    Potassium 4.3 3.5 - 5.2 mmol/L    Chloride 106 96 - 106 mmol/L    Total CO2 19 (L) 20 - 29 mmol/L    Calcium 9.3 8.7 - 10.2 mg/dL    Total Protein 7.1 6.0 - 8.5 g/dL    Albumin 4.2 3.5 - 5.5 g/dL    Globulin 2.9 1.5 - 4.5 g/dL    A/G Ratio 1.4 1.2 - 2.2    Total Bilirubin 0.6 0.0 - 1.2 mg/dL    Alkaline Phosphatase 84 39 - 117 IU/L    AST (SGOT) 15 0 - 40 IU/L    ALT (SGPT) 16 0 - 44 IU/L   Lipid panel   Result Value Ref Range    Total Cholesterol 231 (H) 100 - 199 mg/dL    Triglycerides 87 0 - 149 mg/dL    HDL Cholesterol 41 >39 mg/dL    VLDL Cholesterol 17 5 - 40 mg/dL    LDL Cholesterol  173 (H) 0 - 99 mg/dL   T4, Free   Result Value Ref Range    Free T4 1.48 0.82 - 1.77 ng/dL   T3, Free   Result Value Ref Range    T3, Free 2.4 2.0 - 4.4 pg/mL   TSH   Result Value Ref Range     TSH 3.680 0.450 - 4.500 uIU/mL   Vitamin D 25 Hydroxy   Result Value Ref Range    25 Hydroxy, Vitamin D 15.5 (L) 30.0 - 100.0 ng/mL     He did go to [facility] and [facility] July 12 and 28, 2018 and he been seen prior 11-17-05;  Dr. LOUISE Cárdenas Ed.D. And RAGHAV Delaney Ed. Did eval  Statins make his LFT go up  Reynaldo Madden male 53 y.o. who presents today for follow up of Depression.  He reports medication is working well, patient desires to continue on Rx, and needs refill and some recent work stress. Onset of symptoms was approximately several years ago.  He denies current suicidal and homicidal ideation. Risk factors are family history of anxiety and or depression and lifestyle of multiple roles.  Previous treatment includes current Rx.  He complains of the following medication side effects: none.  The patient has been to counseling..    Some snoring    The following portions of the patient's history were reviewed and updated as appropriate: allergies, current medications, past family history, past medical history, past social history, past surgical history and problem list.    Review of Systems   Constitutional: Negative for activity change, appetite change and unexpected weight change.   HENT: Negative for nosebleeds and trouble swallowing.    Eyes: Negative for pain and visual disturbance.   Respiratory: Negative for chest tightness, shortness of breath and wheezing.    Cardiovascular: Negative for chest pain and palpitations.   Gastrointestinal: Negative for abdominal pain and blood in stool.   Endocrine: Negative.    Genitourinary: Negative for difficulty urinating and hematuria.   Musculoskeletal: Negative for joint swelling.   Skin: Negative for color change and rash.   Allergic/Immunologic: Negative.    Neurological: Negative for syncope and speech difficulty.   Hematological: Negative for adenopathy.   Psychiatric/Behavioral: Negative for agitation and confusion.   All other systems reviewed and are  negative.      Objective   Physical Exam   Constitutional: He is oriented to person, place, and time. He appears well-developed and well-nourished. No distress.   HENT:   Head: Normocephalic and atraumatic.   Eyes: Conjunctivae and EOM are normal. Pupils are equal, round, and reactive to light. Right eye exhibits no discharge. Left eye exhibits no discharge. No scleral icterus.   Neck: Normal range of motion. Neck supple. No tracheal deviation present. No thyromegaly present.   Cardiovascular: Normal rate, regular rhythm, normal heart sounds, intact distal pulses and normal pulses. Exam reveals no gallop.   No murmur heard.  Pulmonary/Chest: Effort normal and breath sounds normal. No respiratory distress. He has no wheezes. He has no rales.   Musculoskeletal: Normal range of motion.   Neurological: He is alert and oriented to person, place, and time. He exhibits normal muscle tone. Coordination normal.   Skin: Skin is warm. No rash noted. No erythema. No pallor.   Psychiatric: He has a normal mood and affect. His behavior is normal. Judgment and thought content normal.   Nursing note and vitals reviewed.      Assessment/Plan   Reynaldo was seen today for hyperlipidemia.    Diagnoses and all orders for this visit:    Acquired hypothyroidism  -     Comprehensive metabolic panel; Future  -     Lipid panel; Future  -     CBC and Differential; Future  -     TSH; Future  -     T3, Free; Future  -     T4, Free; Future  -     Vitamin B12; Future  -     Folate; Future  -     Vitamin D 25 Hydroxy; Future  -     PSA Screen; Future    Recurrent major depressive disorder, in full remission (CMS/HCC)  -     Comprehensive metabolic panel; Future  -     Lipid panel; Future  -     CBC and Differential; Future  -     TSH; Future  -     T3, Free; Future  -     T4, Free; Future  -     Vitamin B12; Future  -     Folate; Future  -     Vitamin D 25 Hydroxy; Future  -     PSA Screen; Future    Mixed hyperlipidemia  -     Comprehensive  metabolic panel; Future  -     Lipid panel; Future  -     CBC and Differential; Future  -     TSH; Future  -     T3, Free; Future  -     T4, Free; Future  -     Vitamin B12; Future  -     Folate; Future  -     Vitamin D 25 Hydroxy; Future  -     PSA Screen; Future    Laboratory examination ordered as part of a routine general medical examination  -     Comprehensive metabolic panel; Future  -     Lipid panel; Future  -     CBC and Differential; Future  -     TSH; Future  -     T3, Free; Future  -     T4, Free; Future  -     Vitamin B12; Future  -     Folate; Future  -     Vitamin D 25 Hydroxy; Future  -     PSA Screen; Future    Other orders  -     Cholecalciferol 2000 units capsule; Take 1 capsule by mouth Daily. One PO daily  -     levothyroxine (SYNTHROID, LEVOTHROID) 75 MCG tablet; Take 1 tablet by mouth Daily. For thyroid  -     ezetimibe (ZETIA) 10 MG tablet; Take 1 tablet by mouth Daily. For cholesterol  -     escitalopram (LEXAPRO) 20 MG tablet; Take 1 tablet by mouth Daily. For depression        Add PSA to labs  Avoid Statins d/t LFTs go up  Cont Lexapro for depression  In summary, Reynaldo Madden, was seen today.  he was seen for  Hyperlipidemia and here to discuss treatment, Hypothyroidism and is well controlled on Rx.  Labs are in desired treatment range and Vitamin D deficiency and well controlled on medication and labs at goal >30,  Labs one year

## 2019-08-03 LAB
Lab: NORMAL
PSA SERPL-MCNC: 1.6 NG/ML (ref 0–4)
SPECIMEN STATUS: NORMAL
WRITTEN AUTHORIZATION: NORMAL

## 2020-02-10 ENCOUNTER — OFFICE VISIT (OUTPATIENT)
Dept: FAMILY MEDICINE CLINIC | Facility: CLINIC | Age: 55
End: 2020-02-10

## 2020-02-10 VITALS
TEMPERATURE: 98.2 F | SYSTOLIC BLOOD PRESSURE: 120 MMHG | RESPIRATION RATE: 16 BRPM | DIASTOLIC BLOOD PRESSURE: 78 MMHG | HEIGHT: 74 IN | OXYGEN SATURATION: 98 % | BODY MASS INDEX: 34.65 KG/M2 | WEIGHT: 270 LBS | HEART RATE: 67 BPM

## 2020-02-10 DIAGNOSIS — J06.9 ACUTE URI: Primary | ICD-10-CM

## 2020-02-10 DIAGNOSIS — R68.89 FLU-LIKE SYMPTOMS: ICD-10-CM

## 2020-02-10 LAB
EXPIRATION DATE: NORMAL
FLUAV AG NPH QL: NEGATIVE
FLUBV AG NPH QL: NEGATIVE
INTERNAL CONTROL: NORMAL
Lab: NORMAL

## 2020-02-10 PROCEDURE — 99213 OFFICE O/P EST LOW 20 MIN: CPT | Performed by: NURSE PRACTITIONER

## 2020-02-10 PROCEDURE — 87804 INFLUENZA ASSAY W/OPTIC: CPT | Performed by: NURSE PRACTITIONER

## 2020-02-10 RX ORDER — METHYLPREDNISOLONE 4 MG/1
TABLET ORAL
Qty: 21 TABLET | Refills: 0 | Status: SHIPPED | OUTPATIENT
Start: 2020-02-10 | End: 2020-12-31 | Stop reason: SDDI

## 2020-02-10 RX ORDER — DEXTROMETHORPHAN HYDROBROMIDE AND PROMETHAZINE HYDROCHLORIDE 15; 6.25 MG/5ML; MG/5ML
5 SYRUP ORAL 4 TIMES DAILY PRN
Qty: 200 ML | Refills: 0 | Status: SHIPPED | OUTPATIENT
Start: 2020-02-10 | End: 2020-02-20

## 2020-02-10 NOTE — PROGRESS NOTES
Subjective   Reynaldo Madden is a 54 y.o. male.     History of Present Illness   Reynaldo Madden 54 y.o. male who presents for evaluation of upper respiratory congestion. Symptoms include ear pressure, congestion, sore throat, post nasal drip, dry cough and runny nose.  Onset of symptoms was 7 days ago, gradually improving since that time. Patient denies shortness of breath, wheezing, fever.   Evaluation to date: none Treatment to date:  Mucinex.  Reports fever, chills, body aches initially which has resolved.       The following portions of the patient's history were reviewed and updated as appropriate: allergies, current medications, past family history, past medical history, past social history, past surgical history and problem list.    Review of Systems   Constitutional: Negative for chills and fever.   HENT: Positive for congestion, postnasal drip and rhinorrhea. Negative for ear pain, sinus pressure and sinus pain.    Respiratory: Positive for cough and chest tightness. Negative for shortness of breath and wheezing.        Objective   Physical Exam   Constitutional: He is oriented to person, place, and time. He appears well-developed and well-nourished.   Cardiovascular: Normal rate and regular rhythm.   Pulmonary/Chest: Effort normal and breath sounds normal.   Neurological: He is alert and oriented to person, place, and time.   Skin: Skin is warm and dry.   Psychiatric: He has a normal mood and affect. His behavior is normal. Judgment and thought content normal.   Nursing note and vitals reviewed.      Assessment/Plan   Reynaldo was seen today for cough, nasal congestion, headache, fatigue and ear fullness.    Diagnoses and all orders for this visit:    Acute URI  -     methylPREDNISolone (MEDROL, GEORGI,) 4 MG tablet; follow package directions  -     promethazine-dextromethorphan (PROMETHAZINE-DM) 6.25-15 MG/5ML syrup; Take 5 mL by mouth 4 (Four) Times a Day As Needed for Cough for up to 10 days.    Flu-like  symptoms  -     POC Influenza A / B

## 2020-07-26 RX ORDER — EZETIMIBE 10 MG/1
TABLET ORAL
Qty: 90 TABLET | Refills: 3 | Status: SHIPPED | OUTPATIENT
Start: 2020-07-26 | End: 2020-12-31 | Stop reason: SDUPTHER

## 2020-08-01 ENCOUNTER — RESULTS ENCOUNTER (OUTPATIENT)
Dept: FAMILY MEDICINE CLINIC | Facility: CLINIC | Age: 55
End: 2020-08-01

## 2020-08-01 DIAGNOSIS — F33.42 RECURRENT MAJOR DEPRESSIVE DISORDER, IN FULL REMISSION (HCC): ICD-10-CM

## 2020-08-01 DIAGNOSIS — E78.2 MIXED HYPERLIPIDEMIA: ICD-10-CM

## 2020-08-01 DIAGNOSIS — Z00.00 LABORATORY EXAMINATION ORDERED AS PART OF A ROUTINE GENERAL MEDICAL EXAMINATION: ICD-10-CM

## 2020-08-01 DIAGNOSIS — E03.9 ACQUIRED HYPOTHYROIDISM: ICD-10-CM

## 2020-08-15 RX ORDER — ESCITALOPRAM OXALATE 20 MG/1
TABLET ORAL
Qty: 90 TABLET | Refills: 0 | Status: SHIPPED | OUTPATIENT
Start: 2020-08-15 | End: 2020-11-13

## 2020-10-30 RX ORDER — LEVOTHYROXINE SODIUM 0.07 MG/1
TABLET ORAL
Qty: 90 TABLET | Refills: 0 | Status: SHIPPED | OUTPATIENT
Start: 2020-10-30 | End: 2020-12-31 | Stop reason: SDUPTHER

## 2020-11-13 RX ORDER — ESCITALOPRAM OXALATE 20 MG/1
TABLET, FILM COATED ORAL
Qty: 90 TABLET | Refills: 0 | Status: SHIPPED | OUTPATIENT
Start: 2020-11-13 | End: 2020-12-31 | Stop reason: SDUPTHER

## 2020-12-30 LAB
25(OH)D3+25(OH)D2 SERPL-MCNC: 16.2 NG/ML (ref 30–100)
ALBUMIN SERPL-MCNC: 4.3 G/DL (ref 3.5–5.2)
ALBUMIN/GLOB SERPL: 1.3 G/DL
ALP SERPL-CCNC: 119 U/L (ref 39–117)
ALT SERPL-CCNC: 23 U/L (ref 1–41)
AST SERPL-CCNC: 16 U/L (ref 1–40)
BASOPHILS # BLD AUTO: 0.04 10*3/MM3 (ref 0–0.2)
BASOPHILS NFR BLD AUTO: 0.7 % (ref 0–1.5)
BILIRUB SERPL-MCNC: 0.7 MG/DL (ref 0–1.2)
BUN SERPL-MCNC: 15 MG/DL (ref 6–20)
BUN/CREAT SERPL: 10.7 (ref 7–25)
CALCIUM SERPL-MCNC: 9.1 MG/DL (ref 8.6–10.5)
CHLORIDE SERPL-SCNC: 104 MMOL/L (ref 98–107)
CHOLEST SERPL-MCNC: 245 MG/DL (ref 0–200)
CO2 SERPL-SCNC: 27 MMOL/L (ref 22–29)
CREAT SERPL-MCNC: 1.4 MG/DL (ref 0.76–1.27)
EOSINOPHIL # BLD AUTO: 0.15 10*3/MM3 (ref 0–0.4)
EOSINOPHIL NFR BLD AUTO: 2.6 % (ref 0.3–6.2)
ERYTHROCYTE [DISTWIDTH] IN BLOOD BY AUTOMATED COUNT: 13.1 % (ref 12.3–15.4)
FOLATE SERPL-MCNC: 2.98 NG/ML (ref 4.78–24.2)
GLOBULIN SER CALC-MCNC: 3.2 GM/DL
GLUCOSE SERPL-MCNC: 87 MG/DL (ref 65–99)
HCT VFR BLD AUTO: 48.7 % (ref 37.5–51)
HDLC SERPL-MCNC: 50 MG/DL (ref 40–60)
HGB BLD-MCNC: 16.3 G/DL (ref 13–17.7)
IMM GRANULOCYTES # BLD AUTO: 0.02 10*3/MM3 (ref 0–0.05)
IMM GRANULOCYTES NFR BLD AUTO: 0.3 % (ref 0–0.5)
LDLC SERPL CALC-MCNC: 179 MG/DL (ref 0–100)
LYMPHOCYTES # BLD AUTO: 1.97 10*3/MM3 (ref 0.7–3.1)
LYMPHOCYTES NFR BLD AUTO: 34.1 % (ref 19.6–45.3)
MCH RBC QN AUTO: 31.5 PG (ref 26.6–33)
MCHC RBC AUTO-ENTMCNC: 33.5 G/DL (ref 31.5–35.7)
MCV RBC AUTO: 94.2 FL (ref 79–97)
MONOCYTES # BLD AUTO: 0.42 10*3/MM3 (ref 0.1–0.9)
MONOCYTES NFR BLD AUTO: 7.3 % (ref 5–12)
NEUTROPHILS # BLD AUTO: 3.18 10*3/MM3 (ref 1.7–7)
NEUTROPHILS NFR BLD AUTO: 55 % (ref 42.7–76)
NRBC BLD AUTO-RTO: 0 /100 WBC (ref 0–0.2)
PLATELET # BLD AUTO: 244 10*3/MM3 (ref 140–450)
POTASSIUM SERPL-SCNC: 4.7 MMOL/L (ref 3.5–5.2)
PROT SERPL-MCNC: 7.5 G/DL (ref 6–8.5)
PSA SERPL-MCNC: 1.52 NG/ML (ref 0–4)
RBC # BLD AUTO: 5.17 10*6/MM3 (ref 4.14–5.8)
SODIUM SERPL-SCNC: 142 MMOL/L (ref 136–145)
T3FREE SERPL-MCNC: 3 PG/ML (ref 2–4.4)
T4 FREE SERPL-MCNC: 1.51 NG/DL (ref 0.93–1.7)
TRIGL SERPL-MCNC: 91 MG/DL (ref 0–150)
TSH SERPL DL<=0.005 MIU/L-ACNC: 5.88 UIU/ML (ref 0.27–4.2)
VIT B12 SERPL-MCNC: 279 PG/ML (ref 211–946)
VLDLC SERPL CALC-MCNC: 16 MG/DL (ref 5–40)
WBC # BLD AUTO: 5.78 10*3/MM3 (ref 3.4–10.8)

## 2020-12-31 ENCOUNTER — OFFICE VISIT (OUTPATIENT)
Dept: FAMILY MEDICINE CLINIC | Facility: CLINIC | Age: 55
End: 2020-12-31

## 2020-12-31 VITALS
DIASTOLIC BLOOD PRESSURE: 70 MMHG | WEIGHT: 288.6 LBS | HEART RATE: 79 BPM | RESPIRATION RATE: 16 BRPM | OXYGEN SATURATION: 100 % | SYSTOLIC BLOOD PRESSURE: 120 MMHG | HEIGHT: 74 IN | BODY MASS INDEX: 37.04 KG/M2 | TEMPERATURE: 97.5 F

## 2020-12-31 DIAGNOSIS — R07.9 CHEST PAIN, UNSPECIFIED TYPE: ICD-10-CM

## 2020-12-31 DIAGNOSIS — E66.01 MORBIDLY OBESE (HCC): ICD-10-CM

## 2020-12-31 DIAGNOSIS — F33.42 RECURRENT MAJOR DEPRESSIVE DISORDER, IN FULL REMISSION (HCC): ICD-10-CM

## 2020-12-31 DIAGNOSIS — E78.2 MIXED HYPERLIPIDEMIA: ICD-10-CM

## 2020-12-31 DIAGNOSIS — E03.9 ACQUIRED HYPOTHYROIDISM: Primary | ICD-10-CM

## 2020-12-31 DIAGNOSIS — E55.9 VITAMIN D DEFICIENCY DISEASE: ICD-10-CM

## 2020-12-31 DIAGNOSIS — N18.31 STAGE 3A CHRONIC KIDNEY DISEASE (HCC): ICD-10-CM

## 2020-12-31 PROCEDURE — 93000 ELECTROCARDIOGRAM COMPLETE: CPT | Performed by: PHYSICIAN ASSISTANT

## 2020-12-31 PROCEDURE — 99396 PREV VISIT EST AGE 40-64: CPT | Performed by: PHYSICIAN ASSISTANT

## 2020-12-31 PROCEDURE — 99214 OFFICE O/P EST MOD 30 MIN: CPT | Performed by: PHYSICIAN ASSISTANT

## 2020-12-31 RX ORDER — MAGNESIUM 200 MG
TABLET ORAL
Qty: 90 EACH | Refills: 3 | Status: SHIPPED | OUTPATIENT
Start: 2020-12-31 | End: 2022-03-24 | Stop reason: SDUPTHER

## 2020-12-31 RX ORDER — LEVOTHYROXINE SODIUM 0.07 MG/1
75 TABLET ORAL DAILY
Qty: 90 TABLET | Refills: 3 | Status: SHIPPED | OUTPATIENT
Start: 2020-12-31 | End: 2021-09-14

## 2020-12-31 RX ORDER — ESCITALOPRAM OXALATE 20 MG/1
20 TABLET ORAL DAILY
Qty: 90 TABLET | Refills: 3 | Status: SHIPPED | OUTPATIENT
Start: 2020-12-31 | End: 2021-06-16

## 2020-12-31 RX ORDER — EZETIMIBE 10 MG/1
10 TABLET ORAL DAILY
Qty: 90 TABLET | Refills: 3 | Status: SHIPPED | OUTPATIENT
Start: 2020-12-31 | End: 2022-03-24 | Stop reason: SDUPTHER

## 2020-12-31 RX ORDER — FOLIC ACID 1 MG/1
1 TABLET ORAL DAILY
Qty: 90 TABLET | Refills: 3 | Status: SHIPPED | OUTPATIENT
Start: 2020-12-31 | End: 2022-08-01 | Stop reason: SDUPTHER

## 2020-12-31 NOTE — PROGRESS NOTES
Subjective   Reynaldo Madden is a 55 y.o. male.     History of Present Illness   Reynaldo Madden 55 y.o. male who presents for an Annual Wellness Visit.  he has a history of   Patient Active Problem List   Diagnosis   • Allergy   • Recurrent major depressive disorder, in full remission (CMS/HCC)   • Erectile dysfunction   • Hyperlipidemia   • Hypothyroidism   • Vitamin D deficiency disease   • Morbidly obese (CMS/[facility])   .  he has been feeling tired.  Labs results discussed in detail with the patient.  Plan to update vaccines if needed today.  I  reviewed health maintenance with him as part of my preventative care plan.    Health Habits:  Dental Exam. up to date  Eye Exam. up to date  Exercise: 0 times/week.  Current exercise activities include: none    Reynaldo Madden 55 y.o. male complains of chest pain. Onset was 3 months ago. Symptoms have resolved since that time. The patient's pain occurs occured for about 30 minutes. The patient describes the pain as heaviness and does not radiate. Patient rates pain as a 5/10 in intensity.  Associated symptoms are: chest pressure/discomfort.   Aggravating factors are: none. Alleviating factors are: none. He denies exertional chest pain or SOA--no further episode.  Patient's cardiac risk factors are: dyslipidemia, hypertension, male gender, obesity (BMI >= 30 kg/m2) and sedentary lifestyle. Patient's risk factors for DVT/PE: none. Previous cardiac testing: none.       Since the last visit, he has overall felt fairly well.  He has Hyperlipidemia on Zetia and is effective with lowering lipid values, Vitamin D deficiency and will update labs for continued management and acquired Hypothyroidism and keep dose same and update labs 3 mos.  he has been compliant with current medications have reviewed them.  The patient denies medication side effects.  Will refill medications. /70 (BP Location: Left arm, Patient Position: Sitting, Cuff Size: Adult)   Pulse 79   Temp 97.5 °F (36.4 °C)  "  Resp 16   Ht 188 cm (74.02\")   Wt 131 kg (288 lb 9.6 oz)   SpO2 100%   BMI 37.04 kg/m²   Not exercising  Vaccines current d/t [employer]  Weight up  Results for orders placed or performed in visit on 08/01/20   Comprehensive metabolic panel    Specimen: Blood   Result Value Ref Range    Glucose 87 65 - 99 mg/dL    BUN 15 6 - 20 mg/dL    Creatinine 1.40 (H) 0.76 - 1.27 mg/dL    eGFR Non African Am 53 (L) >60 mL/min/1.73    eGFR African Am 64 >60 mL/min/1.73    BUN/Creatinine Ratio 10.7 7.0 - 25.0    Sodium 142 136 - 145 mmol/L    Potassium 4.7 3.5 - 5.2 mmol/L    Chloride 104 98 - 107 mmol/L    Total CO2 27.0 22.0 - 29.0 mmol/L    Calcium 9.1 8.6 - 10.5 mg/dL    Total Protein 7.5 6.0 - 8.5 g/dL    Albumin 4.30 3.50 - 5.20 g/dL    Globulin 3.2 gm/dL    A/G Ratio 1.3 g/dL    Total Bilirubin 0.7 0.0 - 1.2 mg/dL    Alkaline Phosphatase 119 (H) 39 - 117 U/L    AST (SGOT) 16 1 - 40 U/L    ALT (SGPT) 23 1 - 41 U/L   Lipid panel    Specimen: Blood   Result Value Ref Range    Total Cholesterol 245 (H) 0 - 200 mg/dL    Triglycerides 91 0 - 150 mg/dL    HDL Cholesterol 50 40 - 60 mg/dL    VLDL Cholesterol William 16 5 - 40 mg/dL    LDL Chol Calc (NIH) 179 (H) 0 - 100 mg/dL   TSH    Specimen: Blood   Result Value Ref Range    TSH 5.880 (H) 0.270 - 4.200 uIU/mL   T3, Free    Specimen: Blood   Result Value Ref Range    T3, Free 3.0 2.0 - 4.4 pg/mL   T4, Free    Specimen: Blood   Result Value Ref Range    Free T4 1.51 0.93 - 1.70 ng/dL   Vitamin B12    Specimen: Blood   Result Value Ref Range    Vitamin B-12 279 211 - 946 pg/mL   Folate    Specimen: Blood   Result Value Ref Range    Folate 2.98 (L) 4.78 - 24.20 ng/mL   Vitamin D 25 Hydroxy    Specimen: Blood   Result Value Ref Range    25 Hydroxy, Vitamin D 16.2 (L) 30.0 - 100.0 ng/ml   PSA Screen    Specimen: Blood   Result Value Ref Range    PSA 1.520 0.000 - 4.000 ng/mL   CBC and Differential    Specimen: Blood   Result Value Ref Range    WBC 5.78 3.40 - 10.80 10*3/mm3    RBC 5.17 " "4.14 - 5.80 10*6/mm3    Hemoglobin 16.3 13.0 - 17.7 g/dL    Hematocrit 48.7 37.5 - 51.0 %    MCV 94.2 79.0 - 97.0 fL    MCH 31.5 26.6 - 33.0 pg    MCHC 33.5 31.5 - 35.7 g/dL    RDW 13.1 12.3 - 15.4 %    Platelets 244 140 - 450 10*3/mm3    Neutrophil Rel % 55.0 42.7 - 76.0 %    Lymphocyte Rel % 34.1 19.6 - 45.3 %    Monocyte Rel % 7.3 5.0 - 12.0 %    Eosinophil Rel % 2.6 0.3 - 6.2 %    Basophil Rel % 0.7 0.0 - 1.5 %    Neutrophils Absolute 3.18 1.70 - 7.00 10*3/mm3    Lymphocytes Absolute 1.97 0.70 - 3.10 10*3/mm3    Monocytes Absolute 0.42 0.10 - 0.90 10*3/mm3    Eosinophils Absolute 0.15 0.00 - 0.40 10*3/mm3    Basophils Absolute 0.04 0.00 - 0.20 10*3/mm3    Immature Granulocyte Rel % 0.3 0.0 - 0.5 %    Immature Grans Absolute 0.02 0.00 - 0.05 10*3/mm3    nRBC 0.0 0.0 - 0.2 /100 WBC     Statins made his LFTs high;  Was not fatty liver and caused by statins  He is on Zetia    Has knee brace right and had steroid inj    Reynaldo Madden male 55 y.o., /70 (BP Location: Left arm, Patient Position: Sitting, Cuff Size: Adult)   Pulse 79   Temp 97.5 °F (36.4 °C)   Resp 16   Ht 188 cm (74.02\")   Wt 131 kg (288 lb 9.6 oz)   SpO2 100%   BMI 37.04 kg/m²   who presents today for follow up of Depression.  He reports medication is working well, patient desires to continue on Rx, and needs refill. Onset of symptoms was approximately several years ago.  He denies current suicidal and homicidal ideation. Risk factors are family history of anxiety and or depression and lifestyle of multiple roles.  Previous treatment includes current Rx.  He complains of the following medication side effects: none.  The patient has previously been in counseling..      The following portions of the patient's history were reviewed and updated as appropriate: allergies, current medications, past family history, past medical history, past social history, past surgical history and problem list.    Review of Systems   Constitutional: Negative " for activity change, appetite change, diaphoresis, fever and unexpected weight change.   HENT: Negative for nosebleeds and trouble swallowing.    Eyes: Negative for pain and visual disturbance.   Respiratory: Negative for chest tightness, shortness of breath and wheezing.    Cardiovascular: Negative for chest pain and palpitations.   Gastrointestinal: Negative for abdominal pain and blood in stool.   Endocrine: Negative.    Genitourinary: Negative for difficulty urinating and hematuria.   Musculoskeletal: Negative for joint swelling.   Skin: Negative for color change and rash.   Allergic/Immunologic: Negative.    Neurological: Negative for syncope and speech difficulty.   Hematological: Negative for adenopathy.   Psychiatric/Behavioral: Negative for agitation, confusion and dysphoric mood. The patient is not nervous/anxious.    All other systems reviewed and are negative.      Objective   Physical Exam  Vitals signs and nursing note reviewed.   Constitutional:       General: He is not in acute distress.     Appearance: He is well-developed. He is obese. He is not ill-appearing, toxic-appearing or diaphoretic.   HENT:      Head: Normocephalic.      Right Ear: Tympanic membrane, ear canal and external ear normal.      Left Ear: Tympanic membrane, ear canal and external ear normal.      Nose: Nose normal. No rhinorrhea.      Mouth/Throat:      Mouth: Mucous membranes are moist.      Pharynx: No oropharyngeal exudate or posterior oropharyngeal erythema.   Eyes:      General: No scleral icterus.     Conjunctiva/sclera: Conjunctivae normal.      Pupils: Pupils are equal, round, and reactive to light.   Neck:      Musculoskeletal: Normal range of motion and neck supple. No muscular tenderness.      Vascular: No carotid bruit.   Cardiovascular:      Rate and Rhythm: Normal rate and regular rhythm.      Pulses: Normal pulses.      Heart sounds: Normal heart sounds. No murmur.   Pulmonary:      Effort: Pulmonary effort is  normal. No respiratory distress.      Breath sounds: Normal breath sounds. No wheezing, rhonchi or rales.   Chest:      Chest wall: No tenderness.   Abdominal:      General: Abdomen is flat. Bowel sounds are normal.      Palpations: Abdomen is soft. There is no mass.      Tenderness: There is no abdominal tenderness. There is no guarding.      Hernia: No hernia is present.   Genitourinary:     Penis: Normal.       Scrotum/Testes: Normal.      Prostate: Normal.      Comments: No hernia  Musculoskeletal: Normal range of motion.         General: No swelling, tenderness or deformity.      Right lower leg: No edema.      Left lower leg: No edema.   Lymphadenopathy:      Cervical: No cervical adenopathy.   Skin:     General: Skin is warm and dry.      Capillary Refill: Capillary refill takes less than 2 seconds.      Coloration: Skin is not jaundiced.      Findings: No erythema or rash.   Neurological:      General: No focal deficit present.      Mental Status: He is alert and oriented to person, place, and time. Mental status is at baseline.      Cranial Nerves: No cranial nerve deficit.      Sensory: No sensory deficit.      Coordination: Coordination normal.      Gait: Gait normal.      Deep Tendon Reflexes: Reflexes normal.   Psychiatric:         Mood and Affect: Mood normal. Affect is not inappropriate.         Behavior: Behavior normal.         Thought Content: Thought content normal.         Judgment: Judgment normal.         Assessment/Plan   Diagnoses and all orders for this visit:    1. Acquired hypothyroidism (Primary)    2. Mixed hyperlipidemia    3. Vitamin D deficiency disease    4. Recurrent major depressive disorder, in full remission (CMS/HCC)    5. Stage 3a chronic kidney disease  -     Ambulatory Referral to Nephrology  -      Renal Bilateral; Future    Other orders  -     folic acid (FOLVITE) 1 MG tablet; Take 1 tablet by mouth Daily.  Dispense: 90 tablet; Refill: 3  -     Cholecalciferol 50 MCG  (2000 UT) capsule; Take 1 capsule by mouth Daily. One PO daily  Dispense: 90 each; Refill: 3  -     Cyanocobalamin (Vitamin B-12) 1000 MCG sublingual tablet; One SL daily  Dispense: 90 each; Refill: 3        Labs show low Vitamin D levels.  I want you to add OTC Vitamin D 2,000 I.U. Daily.  Folic acid is in lower range and want you to start prescription folic acid 1 mg daily and sent Rx.  Vitamin B12 is in lower range.  Start over the counter B12 sublingual 1,000 mcg daily.  Will want to repeat thyroid labs in 3 mos when checking on B12 and folate  Will refer Nephrologist d/t renal labs worse     Low glycemic index diet  Exercise 30 minutes most days of the week  Make sure you get results on any labs or tests we ordered today  We discussed medications and how to take them as prescribed  Sleep 6-8 hours each night if possible    LDL goal <100  HDL goal >60  Triglyceride goal <150  BP goal =<130/80  Fasting glucose <100  Changes in Q waves on EKG--refer cardiologist  D/w Dr Jose F Menjivar, Reynaldo Madden, was seen today.  he was seen for Hyperlipidemia and will continue current medication, Vitamin D deficiency and will update labs  and Acquired hypothyroidism we will update labs in 3 months due to normal free T3 free T4 and slightly elevated TSH.  Adding vit D, folic acid, B12 and f/u labs 3 mos  Renal functions worse--CKD 3 and refer to nephrology for eval    Continue Lexapro for depression working well

## 2020-12-31 NOTE — PROGRESS NOTES
Procedure     ECG 12 Lead    Date/Time: 12/31/2020 10:01 AM  Performed by: Kathleen Benjamin PA-C  Authorized by: Kathleen Benjamin PA-C   Comparison: compared with previous ECG from 5/23/2016  Rhythm: sinus bradycardia  Rate: bradycardic  BPM: 59  Conduction: non-specific intraventricular conduction delay  Q waves: aVF, V1, V2, V3 and III    ST Segments: ST segments normal  T Waves: T waves normal  QRS axis: normal  Other findings: non-specific ST-T wave changes    Clinical impression: abnormal EKG  Comments: EKG Interpretation Report    Heart rate:    59 beats/min, RI interval:  204 msec, QRS duration:  102 msec  QTu:400 msec, QTc:  416 msec

## 2020-12-31 NOTE — PATIENT INSTRUCTIONS

## 2021-01-08 ENCOUNTER — OFFICE VISIT (OUTPATIENT)
Dept: FAMILY MEDICINE CLINIC | Facility: CLINIC | Age: 56
End: 2021-01-08

## 2021-01-08 VITALS
RESPIRATION RATE: 16 BRPM | BODY MASS INDEX: 37.47 KG/M2 | DIASTOLIC BLOOD PRESSURE: 70 MMHG | HEART RATE: 89 BPM | SYSTOLIC BLOOD PRESSURE: 120 MMHG | TEMPERATURE: 97.5 F | WEIGHT: 292 LBS | HEIGHT: 74 IN | OXYGEN SATURATION: 100 %

## 2021-01-08 DIAGNOSIS — R94.31 ABNORMAL EKG: ICD-10-CM

## 2021-01-08 DIAGNOSIS — E03.9 ACQUIRED HYPOTHYROIDISM: Chronic | ICD-10-CM

## 2021-01-08 DIAGNOSIS — N18.31 STAGE 3A CHRONIC KIDNEY DISEASE (HCC): Chronic | ICD-10-CM

## 2021-01-08 DIAGNOSIS — E66.01 MORBIDLY OBESE (HCC): Chronic | ICD-10-CM

## 2021-01-08 DIAGNOSIS — E78.2 MIXED HYPERLIPIDEMIA: Primary | Chronic | ICD-10-CM

## 2021-01-08 PROCEDURE — 99213 OFFICE O/P EST LOW 20 MIN: CPT | Performed by: PHYSICIAN ASSISTANT

## 2021-01-08 NOTE — PROGRESS NOTES
"Subjective   Reynaldo Madden is a 55 y.o. male.     History of Present Illness     Reynaldo Madden 55 y.o. male /70 (BP Location: Left arm, Patient Position: Sitting, Cuff Size: Adult)   Pulse 89   Temp 97.5 °F (36.4 °C)   Resp 16   Ht 188 cm (74.02\")   Wt 132 kg (292 lb)   SpO2 100%   BMI 37.47 kg/m²  who presents today for f/u labs.    he has a history of   Patient Active Problem List   Diagnosis   • Allergy   • Recurrent major depressive disorder, in full remission (CMS/HCC)   • Erectile dysfunction   • Hyperlipidemia   • Hypothyroidism   • Vitamin D deficiency disease   • Morbidly obese (CMS/[facility])   .    We are discussing labs.  Going over renal labs.  Also want to consider change Lexapro to med---less sexual side effects.  Look at Viibryd, Trintellix, Pristiq.  Check cost and can email me      Still need appt cardio for abn ekg    Statins made his LFTs high;  Was not fatty liver and caused by statins  He is on Zetia  He is to have renal u/s.  Has appt nephrologist----has taken NSAIDs  He is taking folic acid.  Has f/u labs thyroid    The following portions of the patient's history were reviewed and updated as appropriate: allergies, current medications, past family history, past medical history, past social history, past surgical history and problem list.    Review of Systems   Constitutional: Negative for activity change, appetite change and unexpected weight change.   HENT: Negative for nosebleeds and trouble swallowing.    Eyes: Negative for pain and visual disturbance.   Respiratory: Negative for chest tightness, shortness of breath and wheezing.    Cardiovascular: Negative for chest pain and palpitations.   Gastrointestinal: Negative for abdominal pain and blood in stool.   Endocrine: Negative.    Genitourinary: Negative for difficulty urinating and hematuria.   Musculoskeletal: Negative for joint swelling.   Skin: Negative for color change and rash.   Allergic/Immunologic: Negative.  "   Neurological: Negative for syncope and speech difficulty.   Hematological: Negative for adenopathy.   Psychiatric/Behavioral: Negative for agitation and confusion.   All other systems reviewed and are negative.      Objective   Physical Exam  Constitutional:       General: He is not in acute distress.     Appearance: He is well-developed. He is not diaphoretic.   HENT:      Head: Normocephalic and atraumatic.      Right Ear: External ear normal.      Left Ear: External ear normal.   Eyes:      General: No scleral icterus.        Right eye: No discharge.         Left eye: No discharge.      Conjunctiva/sclera: Conjunctivae normal.   Neck:      Musculoskeletal: Normal range of motion.      Trachea: No tracheal deviation.   Pulmonary:      Effort: Pulmonary effort is normal. No respiratory distress.      Breath sounds: No stridor.   Abdominal:      Tenderness: There is no guarding.   Musculoskeletal: Normal range of motion.         General: No deformity.   Skin:     Coloration: Skin is not pale.   Neurological:      General: No focal deficit present.      Mental Status: He is alert and oriented to person, place, and time.      Motor: No abnormal muscle tone.      Coordination: Coordination normal.   Psychiatric:         Mood and Affect: Mood normal.         Behavior: Behavior normal.         Thought Content: Thought content normal.         Judgment: Judgment normal.         Assessment/Plan   Diagnoses and all orders for this visit:    1. Mixed hyperlipidemia (Primary)    2. Acquired hypothyroidism    3. Stage 3a chronic kidney disease    4. Morbidly obese (CMS/HCC)    5. Abnormal EKG  -     Ambulatory Referral to Cardiology    cont folic acid and B12  Labs due end of March  See cardio for abn EKG  See nephrologist

## 2021-01-12 ENCOUNTER — OFFICE VISIT (OUTPATIENT)
Dept: CARDIOLOGY | Facility: CLINIC | Age: 56
End: 2021-01-12

## 2021-01-12 VITALS
HEIGHT: 74 IN | WEIGHT: 292 LBS | OXYGEN SATURATION: 98 % | HEART RATE: 65 BPM | BODY MASS INDEX: 37.47 KG/M2 | DIASTOLIC BLOOD PRESSURE: 76 MMHG | RESPIRATION RATE: 16 BRPM | SYSTOLIC BLOOD PRESSURE: 120 MMHG

## 2021-01-12 DIAGNOSIS — R94.31 ABNORMAL EKG: Primary | ICD-10-CM

## 2021-01-12 PROCEDURE — 99204 OFFICE O/P NEW MOD 45 MIN: CPT | Performed by: INTERNAL MEDICINE

## 2021-01-12 NOTE — PROGRESS NOTES
[facility]      Patient Name: Reynaldo Madden  :1965  Age: 55 y.o.  Encounter Provider:  Viet Kumari Jr, MD      Chief Complaint:   Chief Complaint   Patient presents with   • Abnormal ECG         HPI  Reynaldo Madden is a 55 y.o. male past medical history of obesity, dyslipidemia and osteoarthritis who presents for evaluation of abnormal EKG.  He was seen by Kathleen Benjamin for short time ago and was found to have an abnormal EKG showing inferior infarct and possible anteroseptal infarct.  Patient has no cardiac history and had a stress test many years ago which was normal per his report.  He has dyslipidemia currently on Zetia.  He is significantly overweight and has gained about 20 pounds over the past year due to inactivity from the current public health issues.  He is in the [employer] and is trying to get back in shape and wanting to start a more regular exercise program.  He does have right knee pain for which he has been told to wear a brace.  With the activity currently performs he denies chest pain or shortness of air.  No palpitations, dizziness or syncope.  No orthopnea, PND or edema.  He is a lifelong non-smoker who drinks rarely and denies illicit drug use.  No family history of premature coronary artery disease or sudden cardiac death.      The following portions of the patient's history were reviewed and updated as appropriate: allergies, current medications, past family history, past medical history, past social history, past surgical history and problem list.      Review of Systems   Constitution: Positive for weight gain. Negative for chills and fever.   Cardiovascular: Negative for chest pain, leg swelling, near-syncope, orthopnea, palpitations, paroxysmal nocturnal dyspnea and syncope.   Respiratory: Negative for cough and wheezing.    Skin: Negative for itching and rash.   Musculoskeletal: Negative for joint pain and joint swelling.   Gastrointestinal: Negative for  "bloating and abdominal pain.   Neurological: Negative for dizziness and focal weakness.   Psychiatric/Behavioral: Negative for altered mental status and suicidal ideas.     ROS was reviewed, updated and amended when necessary.    OBJECTIVE:   Vital Signs  There were no vitals filed for this visit.  Estimated body mass index is 37.47 kg/m² as calculated from the following:    Height as of 1/8/21: 188 cm (74.02\").    Weight as of 1/8/21: 132 kg (292 lb).    Vitals signs reviewed.   Cardiovascular:      PMI at left midclavicular line. Normal rate. Regular rhythm.      Murmurs: There is no murmur.      No gallop. No click. No rub.   Pulses:     Intact distal pulses.   Edema:     Peripheral edema absent.         Procedures          ASSESSMENT:     Abnormal EKG  Obesity  Dyslipidemia  Hypothyroidism    PLAN OF CARE:     1. Abnormal EKG -possible inferior and anteroseptal infarct.  Patient has no clinical history of cardiac disease.  Before getting back into a significant exercise regimen I think it is important to perform stress study.  Given abnormal EKG patient will need imaging.  We will plan for stress echo.  2. Obesity -optimize nutritional choices and increase activity.  Do not start exercise regimen until stress test is performed.  3. Dyslipidemia -currently on Zetia.  We will review lipid surveillance.  4. Hypothyroidism    Return to clinic 6 months           Discharge Medications          Accurate as of January 12, 2021  2:05 PM. If you have any questions, ask your nurse or doctor.            Continue These Medications      Instructions Start Date   Cholecalciferol 50 MCG (2000 UT) capsule   2,000 Units, Oral, Daily, One PO daily      escitalopram 20 MG tablet  Commonly known as: Lexapro   20 mg, Oral, Daily, For depression      ezetimibe 10 MG tablet  Commonly known as: ZETIA   10 mg, Oral, Daily, for cholesterol      folic acid 1 MG tablet  Commonly known as: FOLVITE   1 mg, Oral, Daily      levothyroxine 75 " MCG tablet  Commonly known as: SYNTHROID, LEVOTHROID   75 mcg, Oral, Daily, For thyroid      Vitamin B-12 1000 MCG sublingual tablet   One SL daily             Thank you for allowing me to participate in the care of your patient,      Sincerely,   Viet Kumari MD   [facility]  01/12/21  14:05 EST

## 2021-01-15 ENCOUNTER — TRANSCRIBE ORDERS (OUTPATIENT)
Dept: LAB | Facility: HOSPITAL | Age: 56
End: 2021-01-15

## 2021-01-15 DIAGNOSIS — Z01.818 OTHER SPECIFIED PRE-OPERATIVE EXAMINATION: Primary | ICD-10-CM

## 2021-01-20 ENCOUNTER — LAB (OUTPATIENT)
Dept: LAB | Facility: HOSPITAL | Age: 56
End: 2021-01-20

## 2021-01-20 ENCOUNTER — HOSPITAL ENCOUNTER (OUTPATIENT)
Dept: ULTRASOUND IMAGING | Facility: HOSPITAL | Age: 56
Discharge: HOME | End: 2021-01-20

## 2021-01-20 DIAGNOSIS — Z01.818 OTHER SPECIFIED PRE-OPERATIVE EXAMINATION: ICD-10-CM

## 2021-01-20 DIAGNOSIS — N18.31 STAGE 3A CHRONIC KIDNEY DISEASE (HCC): ICD-10-CM

## 2021-01-20 PROCEDURE — 76775 US EXAM ABDO BACK WALL LIM: CPT

## 2021-01-20 PROCEDURE — C9803 HOPD COVID-19 SPEC COLLECT: HCPCS

## 2021-01-20 PROCEDURE — U0004 COV-19 TEST NON-CDC HGH THRU: HCPCS

## 2021-01-21 LAB — SARS-COV-2 RNA RESP QL NAA+PROBE: NOT DETECTED

## 2021-01-22 ENCOUNTER — HOSPITAL ENCOUNTER (OUTPATIENT)
Dept: CARDIOLOGY | Facility: HOSPITAL | Age: 56
Discharge: HOME | End: 2021-01-22
Admitting: INTERNAL MEDICINE

## 2021-01-22 VITALS
HEART RATE: 66 BPM | OXYGEN SATURATION: 95 % | HEIGHT: 74 IN | DIASTOLIC BLOOD PRESSURE: 70 MMHG | BODY MASS INDEX: 37.6 KG/M2 | SYSTOLIC BLOOD PRESSURE: 110 MMHG | WEIGHT: 293 LBS

## 2021-01-22 DIAGNOSIS — R94.31 ABNORMAL EKG: ICD-10-CM

## 2021-01-22 LAB
ASCENDING AORTA: 2.9 CM
BH CV ECHO MEAS - ACS: 2 CM
BH CV ECHO MEAS - AO MAX PG: 8.2 MMHG
BH CV ECHO MEAS - AO MEAN PG: 4 MMHG
BH CV ECHO MEAS - AO ROOT AREA (BSA CORRECTED): 1.2
BH CV ECHO MEAS - AO ROOT AREA: 7.1 CM^2
BH CV ECHO MEAS - AO ROOT DIAM: 3 CM
BH CV ECHO MEAS - AO V2 MAX: 143 CM/SEC
BH CV ECHO MEAS - AO V2 MEAN: 97.7 CM/SEC
BH CV ECHO MEAS - AO V2 VTI: 31.3 CM
BH CV ECHO MEAS - ASC AORTA: 2.9 CM
BH CV ECHO MEAS - BSA(HAYCOCK): 2.7 M^2
BH CV ECHO MEAS - BSA: 2.6 M^2
BH CV ECHO MEAS - BZI_BMI: 37.5 KILOGRAMS/M^2
BH CV ECHO MEAS - BZI_METRIC_HEIGHT: 188 CM
BH CV ECHO MEAS - BZI_METRIC_WEIGHT: 132.5 KG
BH CV ECHO MEAS - EDV(MOD-SP4): 150 ML
BH CV ECHO MEAS - EDV(TEICH): 46.5 ML
BH CV ECHO MEAS - EF(CUBED): 73.4 %
BH CV ECHO MEAS - EF(MOD-BP): 67 %
BH CV ECHO MEAS - EF(MOD-SP4): 66.7 %
BH CV ECHO MEAS - EF(TEICH): 66.4 %
BH CV ECHO MEAS - ESV(MOD-SP4): 50 ML
BH CV ECHO MEAS - ESV(TEICH): 15.6 ML
BH CV ECHO MEAS - FS: 35.7 %
BH CV ECHO MEAS - IVS/LVPW: 1
BH CV ECHO MEAS - IVSD: 1.3 CM
BH CV ECHO MEAS - LAT PEAK E' VEL: 8.6 CM/SEC
BH CV ECHO MEAS - LV DIASTOLIC VOL/BSA (35-75): 58.8 ML/M^2
BH CV ECHO MEAS - LV MASS(C)D: 143.2 GRAMS
BH CV ECHO MEAS - LV MASS(C)DI: 56.1 GRAMS/M^2
BH CV ECHO MEAS - LV SYSTOLIC VOL/BSA (12-30): 19.6 ML/M^2
BH CV ECHO MEAS - LVIDD: 3.4 CM
BH CV ECHO MEAS - LVIDS: 2.2 CM
BH CV ECHO MEAS - LVLD AP4: 8.5 CM
BH CV ECHO MEAS - LVLS AP4: 6.8 CM
BH CV ECHO MEAS - LVPWD: 1.3 CM
BH CV ECHO MEAS - MED PEAK E' VEL: 5.5 CM/SEC
BH CV ECHO MEAS - MV A DUR: 0.17 SEC
BH CV ECHO MEAS - MV A MAX VEL: 108.3 CM/SEC
BH CV ECHO MEAS - MV DEC SLOPE: 215.6 CM/SEC^2
BH CV ECHO MEAS - MV DEC TIME: 0.32 SEC
BH CV ECHO MEAS - MV E MAX VEL: 80.4 CM/SEC
BH CV ECHO MEAS - MV E/A: 0.74
BH CV ECHO MEAS - MV P1/2T MAX VEL: 85.5 CM/SEC
BH CV ECHO MEAS - MV P1/2T: 116.2 MSEC
BH CV ECHO MEAS - MVA P1/2T LCG: 2.6 CM^2
BH CV ECHO MEAS - MVA(P1/2T): 1.9 CM^2
BH CV ECHO MEAS - PULM A REVS DUR: 0.17 SEC
BH CV ECHO MEAS - PULM A REVS VEL: 27 CM/SEC
BH CV ECHO MEAS - PULM DIAS VEL: 43.9 CM/SEC
BH CV ECHO MEAS - PULM S/D: 1.2
BH CV ECHO MEAS - PULM SYS VEL: 53.6 CM/SEC
BH CV ECHO MEAS - SI(AO): 86.6 ML/M^2
BH CV ECHO MEAS - SI(CUBED): 11 ML/M^2
BH CV ECHO MEAS - SI(MOD-SP4): 39.2 ML/M^2
BH CV ECHO MEAS - SI(TEICH): 12.1 ML/M^2
BH CV ECHO MEAS - SV(AO): 221 ML
BH CV ECHO MEAS - SV(CUBED): 28.1 ML
BH CV ECHO MEAS - SV(MOD-SP4): 100 ML
BH CV ECHO MEAS - SV(TEICH): 30.8 ML
BH CV ECHO MEAS - TAPSE (>1.6): 2.7 CM
BH CV ECHO MEASUREMENTS AVERAGE E/E' RATIO: 11.4
BH CV STRESS BP STAGE 1: NORMAL
BH CV STRESS BP STAGE 2: NORMAL
BH CV STRESS BP STAGE 3: NORMAL
BH CV STRESS DURATION MIN STAGE 1: 3
BH CV STRESS DURATION MIN STAGE 2: 3
BH CV STRESS DURATION MIN STAGE 3: 3
BH CV STRESS DURATION SEC STAGE 1: 0
BH CV STRESS DURATION SEC STAGE 2: 0
BH CV STRESS DURATION SEC STAGE 3: 0
BH CV STRESS ECHO POST STRESS EJECTION FRACTION EF: 75 %
BH CV STRESS GRADE STAGE 1: 10
BH CV STRESS GRADE STAGE 2: 12
BH CV STRESS GRADE STAGE 3: 14
BH CV STRESS HR STAGE 1: 101
BH CV STRESS HR STAGE 2: 122
BH CV STRESS HR STAGE 3: 146
BH CV STRESS METS STAGE 1: 5
BH CV STRESS METS STAGE 2: 7.5
BH CV STRESS METS STAGE 3: 10
BH CV STRESS PROTOCOL 1: NORMAL
BH CV STRESS SPEED STAGE 1: 1.7
BH CV STRESS SPEED STAGE 2: 2.5
BH CV STRESS SPEED STAGE 3: 3.4
BH CV STRESS STAGE 1: 1
BH CV STRESS STAGE 2: 2
BH CV STRESS STAGE 3: 3
BH CV XLRA - RV BASE: 2.5 CM
BH CV XLRA - RV LENGTH: 7.7 CM
BH CV XLRA - RV MID: 2.6 CM
BH CV XLRA - TDI S': 18 CM/SEC
LEFT ATRIUM VOLUME INDEX: 15 ML/M2
LV EF 2D ECHO EST: 67 %
MAXIMAL PREDICTED HEART RATE: 165 BPM
PERCENT MAX PREDICTED HR: 88.48 %
SINUS: 3.2 CM
STJ: 2.8 CM
STRESS BASELINE BP: NORMAL MMHG
STRESS BASELINE HR: 66 BPM
STRESS O2 SAT REST: 95 %
STRESS PERCENT HR: 104 %
STRESS POST ESTIMATED WORKLOAD: 10 METS
STRESS POST EXERCISE DUR MIN: 9 MIN
STRESS POST EXERCISE DUR SEC: 0 SEC
STRESS POST PEAK BP: NORMAL MMHG
STRESS POST PEAK HR: 146 BPM
STRESS TARGET HR: 140 BPM

## 2021-01-22 PROCEDURE — 93320 DOPPLER ECHO COMPLETE: CPT

## 2021-01-22 PROCEDURE — 93325 DOPPLER ECHO COLOR FLOW MAPG: CPT | Performed by: INTERNAL MEDICINE

## 2021-01-22 PROCEDURE — 93350 STRESS TTE ONLY: CPT

## 2021-01-22 PROCEDURE — 25010000002 PERFLUTREN (DEFINITY) 8.476 MG IN SODIUM CHLORIDE (PF) 0.9 % 10 ML INJECTION: Performed by: INTERNAL MEDICINE

## 2021-01-22 PROCEDURE — 93016 CV STRESS TEST SUPVJ ONLY: CPT | Performed by: INTERNAL MEDICINE

## 2021-01-22 PROCEDURE — 93018 CV STRESS TEST I&R ONLY: CPT | Performed by: INTERNAL MEDICINE

## 2021-01-22 PROCEDURE — 93352 ADMIN ECG CONTRAST AGENT: CPT | Performed by: INTERNAL MEDICINE

## 2021-01-22 PROCEDURE — 93325 DOPPLER ECHO COLOR FLOW MAPG: CPT

## 2021-01-22 PROCEDURE — 93017 CV STRESS TEST TRACING ONLY: CPT

## 2021-01-22 PROCEDURE — 93320 DOPPLER ECHO COMPLETE: CPT | Performed by: INTERNAL MEDICINE

## 2021-01-22 PROCEDURE — 93350 STRESS TTE ONLY: CPT | Performed by: INTERNAL MEDICINE

## 2021-01-22 RX ADMIN — PERFLUTREN 3 ML: 6.52 INJECTION, SUSPENSION INTRAVENOUS at 10:25

## 2021-03-24 ENCOUNTER — BULK ORDERING (OUTPATIENT)
Dept: CASE MANAGEMENT | Facility: OTHER | Age: 56
End: 2021-03-24

## 2021-03-24 DIAGNOSIS — Z23 IMMUNIZATION DUE: ICD-10-CM

## 2021-03-30 ENCOUNTER — IMMUNIZATION (OUTPATIENT)
Dept: VACCINE CLINIC | Facility: HOSPITAL | Age: 56
End: 2021-03-30

## 2021-03-30 PROCEDURE — 91300 HC SARSCOV02 VAC 30MCG/0.3ML IM: CPT | Performed by: OBSTETRICS & GYNECOLOGY

## 2021-03-30 PROCEDURE — 0001A: CPT | Performed by: OBSTETRICS & GYNECOLOGY

## 2021-04-20 ENCOUNTER — IMMUNIZATION (OUTPATIENT)
Dept: VACCINE CLINIC | Facility: HOSPITAL | Age: 56
End: 2021-04-20

## 2021-04-20 PROCEDURE — 0002A: CPT | Performed by: OBSTETRICS & GYNECOLOGY

## 2021-04-20 PROCEDURE — 91300 HC SARSCOV02 VAC 30MCG/0.3ML IM: CPT | Performed by: OBSTETRICS & GYNECOLOGY

## 2021-06-16 ENCOUNTER — OFFICE VISIT (OUTPATIENT)
Dept: FAMILY MEDICINE CLINIC | Facility: CLINIC | Age: 56
End: 2021-06-16

## 2021-06-16 VITALS
DIASTOLIC BLOOD PRESSURE: 80 MMHG | WEIGHT: 293 LBS | TEMPERATURE: 97.5 F | HEART RATE: 67 BPM | SYSTOLIC BLOOD PRESSURE: 120 MMHG | BODY MASS INDEX: 37.6 KG/M2 | HEIGHT: 74 IN | OXYGEN SATURATION: 96 % | RESPIRATION RATE: 16 BRPM

## 2021-06-16 DIAGNOSIS — E03.9 ACQUIRED HYPOTHYROIDISM: ICD-10-CM

## 2021-06-16 DIAGNOSIS — F33.42 RECURRENT MAJOR DEPRESSIVE DISORDER, IN FULL REMISSION (HCC): Primary | ICD-10-CM

## 2021-06-16 DIAGNOSIS — E55.9 VITAMIN D DEFICIENCY DISEASE: ICD-10-CM

## 2021-06-16 DIAGNOSIS — E78.2 MIXED HYPERLIPIDEMIA: ICD-10-CM

## 2021-06-16 DIAGNOSIS — E66.01 MORBIDLY OBESE (HCC): ICD-10-CM

## 2021-06-16 DIAGNOSIS — E53.8 LOW FOLIC ACID: ICD-10-CM

## 2021-06-16 PROCEDURE — 99213 OFFICE O/P EST LOW 20 MIN: CPT | Performed by: PHYSICIAN ASSISTANT

## 2021-06-16 RX ORDER — DESVENLAFAXINE SUCCINATE 50 MG/1
50 TABLET, EXTENDED RELEASE ORAL DAILY
Qty: 30 TABLET | Refills: 0 | Status: SHIPPED | OUTPATIENT
Start: 2021-06-16 | End: 2021-06-16 | Stop reason: SDUPTHER

## 2021-06-16 RX ORDER — DESVENLAFAXINE SUCCINATE 50 MG/1
50 TABLET, EXTENDED RELEASE ORAL DAILY
Qty: 90 TABLET | Refills: 0 | Status: SHIPPED | OUTPATIENT
Start: 2021-06-16 | End: 2021-07-23

## 2021-06-16 NOTE — PROGRESS NOTES
"Subjective   Reynaldo Madden is a 55 y.o. male.     History of Present Illness   Reynaldo Madden male 55 y.o., /80   Pulse 67   Temp 97.5 °F (36.4 °C)   Resp 16   Ht 188 cm (74.02\")   Wt 133 kg (293 lb)   SpO2 96%   BMI 37.60 kg/m²   who presents today for follow up of Depression.  He reports overall Lexapro helps depression and has worked well, just sexual adverse effects.  Can have anxiety at times. Trintillex and Viibryd not on ins. I will let him try Pristiq.  . Onset of symptoms was approximately several years ago.  He denies current suicidal and homicidal ideation. Risk factors are family history of anxiety and or depression and lifestyle of multiple roles.  Previous treatment includes current Rx.  He complains of the following medication side effects: sexual dysfunction. The patient has previously been in counseling..    Statins raise his LFTs; on Zetia  He did see nephrology and I do want a note that I am expected that his creatinine will run normally around 1.4-1.5.  The rest of his work-up was negative.    I did have patient see cardiology for abnormal EKG findings and work-up was done in January and stress echo performed and negative report.    I do have f/u labs----he is taking folic acid;  Need f/u thyroid labs    Had h/a when out of thyroid Rx 10 days----resolved    Still need f/u labs  The following portions of the patient's history were reviewed and updated as appropriate: allergies, current medications, past family history, past medical history, past social history, past surgical history and problem list.    Review of Systems   Constitutional: Negative for activity change, appetite change and unexpected weight change.   HENT: Negative for nosebleeds and trouble swallowing.    Eyes: Negative for pain and visual disturbance.   Respiratory: Negative for chest tightness, shortness of breath and wheezing.    Cardiovascular: Negative for chest pain and palpitations.   Gastrointestinal: Negative " for abdominal pain and blood in stool.   Endocrine: Negative.    Genitourinary: Negative for difficulty urinating and hematuria.   Musculoskeletal: Negative for joint swelling.   Skin: Negative for color change and rash.   Allergic/Immunologic: Negative.    Neurological: Negative for syncope and speech difficulty.   Hematological: Negative for adenopathy.   Psychiatric/Behavioral: Negative for agitation and confusion.   All other systems reviewed and are negative.      Objective   Physical Exam  Vitals and nursing note reviewed.   Constitutional:       General: He is not in acute distress.     Appearance: He is well-developed. He is obese. He is not diaphoretic.   HENT:      Head: Normocephalic.   Eyes:      General: No scleral icterus.     Conjunctiva/sclera: Conjunctivae normal.      Pupils: Pupils are equal, round, and reactive to light.   Cardiovascular:      Rate and Rhythm: Normal rate and regular rhythm.      Heart sounds: Normal heart sounds. No murmur heard.     Pulmonary:      Effort: Pulmonary effort is normal.      Breath sounds: Normal breath sounds.   Musculoskeletal:         General: Normal range of motion.      Cervical back: Normal range of motion and neck supple.   Skin:     General: Skin is warm and dry.      Coloration: Skin is not jaundiced.      Findings: No rash.   Neurological:      General: No focal deficit present.      Mental Status: He is alert and oriented to person, place, and time. Mental status is at baseline.   Psychiatric:         Mood and Affect: Mood normal. Affect is not inappropriate.         Behavior: Behavior normal.         Thought Content: Thought content normal.         Judgment: Judgment normal.         Assessment/Plan   Diagnoses and all orders for this visit:    1. Recurrent major depressive disorder, in full remission (CMS/HCC) (Primary)    2. Mixed hyperlipidemia    3. Morbidly obese (CMS/HCC)    4. Acquired hypothyroidism    5. Vitamin D deficiency disease    6. Low  folic acid      On Zetia--statins raise LFTs  Taper Lexapro ----10mg X 5 days and 5mg X 4 days; ---start Pristiq at 50mg daily ---f/u 1 mo  Labs to f/u from abn TSH, low folate--on Rx, BMP

## 2021-06-16 NOTE — PATIENT INSTRUCTIONS
Exercising to Lose Weight  Exercise is structured, repetitive physical activity to improve fitness and health. Getting regular exercise is important for everyone. It is especially important if you are overweight. Being overweight increases your risk of heart disease, stroke, diabetes, high blood pressure, and several types of cancer. Reducing your calorie intake and exercising can help you lose weight.  Exercise is usually categorized as moderate or vigorous intensity. To lose weight, most people need to do a certain amount of moderate-intensity or vigorous-intensity exercise each week.  Moderate-intensity exercise    Moderate-intensity exercise is any activity that gets you moving enough to burn at least three times more energy (calories) than if you were sitting.  Examples of moderate exercise include:  · Walking a mile in 15 minutes.  · Doing light yard work.  · Biking at an easy pace.  Most people should get at least 150 minutes (2 hours and 30 minutes) a week of moderate-intensity exercise to maintain their body weight.  Vigorous-intensity exercise  Vigorous-intensity exercise is any activity that gets you moving enough to burn at least six times more calories than if you were sitting. When you exercise at this intensity, you should be working hard enough that you are not able to carry on a conversation.  Examples of vigorous exercise include:  · Running.  · Playing a team sport, such as football, basketball, and soccer.  · Jumping rope.  Most people should get at least 75 minutes (1 hour and 15 minutes) a week of vigorous-intensity exercise to maintain their body weight.  How can exercise affect me?  When you exercise enough to burn more calories than you eat, you lose weight. Exercise also reduces body fat and builds muscle. The more muscle you have, the more calories you burn. Exercise also:  · Improves mood.  · Reduces stress and tension.  · Improves your overall fitness, flexibility, and  endurance.  · Increases bone strength.  The amount of exercise you need to lose weight depends on:  · Your age.  · The type of exercise.  · Any health conditions you have.  · Your overall physical ability.  Talk to your health care provider about how much exercise you need and what types of activities are safe for you.  What actions can I take to lose weight?  Nutrition    · Make changes to your diet as told by your health care provider or diet and nutrition specialist (dietitian). This may include:  ? Eating fewer calories.  ? Eating more protein.  ? Eating less unhealthy fats.  ? Eating a diet that includes fresh fruits and vegetables, whole grains, low-fat dairy products, and lean protein.  ? Avoiding foods with added fat, salt, and sugar.  · Drink plenty of water while you exercise to prevent dehydration or heat stroke.  Activity  · Choose an activity that you enjoy and set realistic goals. Your health care provider can help you make an exercise plan that works for you.  · Exercise at a moderate or vigorous intensity most days of the week.  ? The intensity of exercise may vary from person to person. You can tell how intense a workout is for you by paying attention to your breathing and heartbeat. Most people will notice their breathing and heartbeat get faster with more intense exercise.  · Do resistance training twice each week, such as:  ? Push-ups.  ? Sit-ups.  ? Lifting weights.  ? Using resistance bands.  · Getting short amounts of exercise can be just as helpful as long structured periods of exercise. If you have trouble finding time to exercise, try to include exercise in your daily routine.  ? Get up, stretch, and walk around every 30 minutes throughout the day.  ? Go for a walk during your lunch break.  ? Park your car farther away from your destination.  ? If you take public transportation, get off one stop early and walk the rest of the way.  ? Make phone calls while standing up and walking  around.  ? Take the stairs instead of elevators or escalators.  · Wear comfortable clothes and shoes with good support.  · Do not exercise so much that you hurt yourself, feel dizzy, or get very short of breath.  Where to find more information  · [place] Department of Health and Human Services: www.hhs.gov  · Centers for Disease Control and Prevention (CDC): www.cdc.gov  Contact a health care provider:  · Before starting a new exercise program.  · If you have questions or concerns about your weight.  · If you have a medical problem that keeps you from exercising.  Get help right away if you have any of the following while exercising:  · Injury.  · Dizziness.  · Difficulty breathing or shortness of breath that does not go away when you stop exercising.  · Chest pain.  · Rapid heartbeat.  Summary  · Being overweight increases your risk of heart disease, stroke, diabetes, high blood pressure, and several types of cancer.  · Losing weight happens when you burn more calories than you eat.  · Reducing the amount of calories you eat in addition to getting regular moderate or vigorous exercise each week helps you lose weight.  This information is not intended to replace advice given to you by your health care provider. Make sure you discuss any questions you have with your health care provider.  Document Revised: 12/31/2018 Document Reviewed: 12/31/2018  [employer] Patient Education © 2021 [employer]    Desvenlafaxine extended-release tablets  What is this medicine?  DESVENLAFAXINE (panfilo ZULEIKA la FAX een) is used to treat depression.  This medicine may be used for other purposes; ask your health care provider or pharmacist if you have questions.  COMMON BRAND NAME(S): Xi Dean  What should I tell my health care provider before I take this medicine?  They need to know if you have any of these conditions:  · glaucoma  · high blood pressure  · kidney disease  · liver disease  · maicol or bipolar disorder  · suicidal  thoughts or a previous suicide attempt  · an unusual reaction to desvenlafaxine, venlafaxine, other medicines, foods, dyes, or preservatives  · pregnant or trying to get pregnant  · breast-feeding  How should I use this medicine?  Take this medicine by mouth with a drink of water. Do not crush, cut or chew. Follow the directions on the prescription label. Take your doses at regular intervals. Do not take your medicine more often than directed. Do not stop taking this medicine suddenly except upon the advice of your doctor. Stopping this medicine too quickly may cause serious side effects or your condition may worsen.  Contact your pediatrician or health care professional regarding the use of this medicine in children. Special care may be needed.  A special MedGuide will be given to you by the pharmacist with each prescription and refill. Be sure to read this information carefully each time.  Overdosage: If you think you have taken too much of this medicine contact a poison control center or emergency room at once.  NOTE: This medicine is only for you. Do not share this medicine with others.  What if I miss a dose?  If you miss a dose, take it as soon as you can. If it is almost time for your next dose, take only that dose. Do not take double or extra doses.  What may interact with this medicine?  Do not take this medicine with any of the following medications:  · duloxetine  · levomilnacipran  · linezolid  · MAOIs like Carbex, Eldepryl, Marplan, Nardil, and Parnate  · methylene blue (injected into a vein)  · milnacipran  · venlafaxine  This medicine may also interact with the following medications:  · alcohol  · amphetamines  · aspirin and aspirin-like medicines  · certain migraine headache medicines (almotriptan, eletriptan, frovatriptan, naratriptan, rizatriptan, sumatriptan, zolmitriptan)  · dexfenfluramine or fenfluramine  · furazolidone  · isoniazid  · lithium  · medicines for heart rhythm or blood  pressure  · medicines that treat or prevent blood clots like warfarin, enoxaparin, and dalteparin  · methylphenidate  · metoclopramide  · NSAIDS, medicines for pain and inflammation, like ibuprofen or naproxen  · pentazocine  · phentermine  · procarbazine  · protriptyline  · rasagiline  · sibutramine  · Nikos's wort, [place] perforatum  · tramadol  · tryptophan  · zolpidem  This list may not describe all possible interactions. Give your health care provider a list of all the medicines, herbs, non-prescription drugs, or dietary supplements you use. Also tell them if you smoke, drink alcohol, or use illegal drugs. Some items may interact with your medicine.  What should I watch for while using this medicine?  Tell your doctor if your symptoms do not get better or if they get worse. Visit your doctor or health care professional for regular checks on your progress. Because it may take several weeks to see the full effects of this medicine, it is important to continue your treatment as prescribed by your doctor.  Patients and their families should watch out for new or worsening thoughts of suicide or depression. Also watch out for sudden changes in feelings such as feeling anxious, agitated, panicky, irritable, hostile, aggressive, impulsive, severely restless, overly excited and hyperactive, or not being able to sleep. If this happens, especially at the beginning of treatment or after a change in dose, call your health care professional.  This medicine can cause an increase in blood pressure. Check with your doctor for instructions on monitoring your blood pressure while taking this medicine.  You may get drowsy or dizzy. Do not drive, use machinery, or do anything that needs mental alertness until you know how this medicine affects you. Do not stand or sit up quickly, especially if you are an older patient. This reduces the risk of dizzy or fainting spells. Alcohol may interfere with the effect of this medicine.  Avoid alcoholic drinks.  Your mouth may get dry. Chewing sugarless gum, sucking hard candy and drinking plenty of water will help. Contact your doctor if the problem does not go away or is severe.  What side effects may I notice from receiving this medicine?  Side effects that you should report to your doctor or health care professional as soon as possible:  · allergic reactions like skin rash, itching or hives, swelling of the face, lips, or tongue  · anxious  · breathing problems  · confusion  · changes in vision  · chest pain  · confusion  · elevated mood, decreased need for sleep, racing thoughts, impulsive behavior  · eye pain  · fast, irregular heartbeat  · feeling faint or lightheaded, falls  · feeling agitated, angry, or irritable  · hallucination, loss of contact with reality  · high blood pressure  · loss of balance or coordination  · palpitations  · redness, blistering, peeling or loosening of the skin, including inside the mouth  · restlessness, pacing, inability to keep still  · seizures  · stiff muscles  · suicidal thoughts or other mood changes  · trouble passing urine or change in the amount of urine  · trouble sleeping  · unusual bleeding or bruising  · unusually weak or tired  · vomiting  Side effects that usually do not require medical attention (report to your doctor or health care professional if they continue or are bothersome):  · change in sex drive or performance  · change in appetite or weight  · constipation  · dizziness  · dry mouth  · headache  · increased sweating  · nausea  · tired  This list may not describe all possible side effects. Call your doctor for medical advice about side effects. You may report side effects to FDA at 2-147-UVI-3391.  Where should I keep my medicine?  Keep out of the reach of children.  Store at room temperature between 15 and 30 degrees C (59 and 86 degrees F). Throw away any unused medicine after the expiration date.  NOTE: This sheet is a summary. It may  not cover all possible information. If you have questions about this medicine, talk to your doctor, pharmacist, or health care provider.  © 2021 [employer] (2017-05-18 17:35:48)

## 2021-07-13 ENCOUNTER — OFFICE VISIT (OUTPATIENT)
Dept: CARDIOLOGY | Facility: CLINIC | Age: 56
End: 2021-07-13

## 2021-07-13 VITALS
HEIGHT: 74 IN | BODY MASS INDEX: 37.47 KG/M2 | DIASTOLIC BLOOD PRESSURE: 90 MMHG | HEART RATE: 61 BPM | WEIGHT: 292 LBS | SYSTOLIC BLOOD PRESSURE: 140 MMHG | OXYGEN SATURATION: 98 % | RESPIRATION RATE: 16 BRPM

## 2021-07-13 DIAGNOSIS — R94.31 ABNORMAL EKG: Primary | ICD-10-CM

## 2021-07-13 PROCEDURE — 99213 OFFICE O/P EST LOW 20 MIN: CPT | Performed by: INTERNAL MEDICINE

## 2021-07-13 NOTE — PROGRESS NOTES
[facility]      Patient Name: Reynaldo Madden  :1965  Age: 55 y.o.  Encounter Provider:  Viet Kumari Jr, MD      Chief Complaint:   Chief Complaint   Patient presents with   • Hyperlipidemia     6 months follow up    • Hypothyroidism   • Abnormal ECG         Hyperlipidemia  Exacerbating diseases include hypothyroidism. Pertinent negatives include no chest pain or focal weakness.   Hypothyroidism  Pertinent negatives include no abdominal pain, chest pain, chills, coughing, fever, joint swelling or rash.     Reynaldo Madden is a 55 y.o. male past medical history of obesity, dyslipidemia and osteoarthritis who presents for follow-up evaluation of abnormal EKG.      Last clinic visit note: He was seen by Kathleen Benjamin for short time ago and was found to have an abnormal EKG showing inferior infarct and possible anteroseptal infarct.  Patient has no cardiac history and had a stress test many years ago which was normal per his report.  He has dyslipidemia currently on Zetia.  He is significantly overweight and has gained about 20 pounds over the past year due to inactivity from the current public health issues.  He is in the [employer] and is trying to get back in shape and wanting to start a more regular exercise program.  He does have right knee pain for which he has been told to wear a brace.  With the activity currently performs he denies chest pain or shortness of air.  No palpitations, dizziness or syncope.  No orthopnea, PND or edema.  He is a lifelong non-smoker who drinks rarely and denies illicit drug use.  No family history of premature coronary artery disease or sudden cardiac death.    Stress echo performed showing structurally normal heart on baseline echo with no evidence for ischemia at peak stress.  Has been doing well since last visit.  Is not really increased his activity much but has no limitations with physical activity.  Blood pressure slightly elevated today which she  "says is not abnormal for him on doctors visits.  He is tolerating all medications well.  He is a non-smoker who drinks socially denies illicit drug use.  Family history was reviewed and is not pertinent to this clinic visit.    The following portions of the patient's history were reviewed and updated as appropriate: allergies, current medications, past family history, past medical history, past social history, past surgical history and problem list.      Review of Systems   Constitutional: Positive for weight gain. Negative for chills and fever.   Cardiovascular: Negative for chest pain, leg swelling, near-syncope, orthopnea, palpitations, paroxysmal nocturnal dyspnea and syncope.   Respiratory: Negative for cough and wheezing.    Skin: Negative for itching and rash.   Musculoskeletal: Negative for joint pain and joint swelling.   Gastrointestinal: Negative for bloating and abdominal pain.   Neurological: Negative for dizziness and focal weakness.   Psychiatric/Behavioral: Negative for altered mental status and suicidal ideas.     ROS was reviewed, updated and amended when necessary.    OBJECTIVE:   Vital Signs  Vitals:    07/13/21 1438   BP: 140/90   Pulse: 61   Resp: 16   SpO2: 98%     Estimated body mass index is 37.49 kg/m² as calculated from the following:    Height as of this encounter: 188 cm (74\").    Weight as of this encounter: 132 kg (292 lb).    Vitals reviewed.   Cardiovascular:      PMI at left midclavicular line. Normal rate. Regular rhythm.      Murmurs: There is no murmur.      No gallop. No click. No rub.   Pulses:     Intact distal pulses.   Edema:     Peripheral edema absent.     Physical exam was reviewed, updated and amended when necessary.    Procedures          ASSESSMENT:     Abnormal EKG  Obesity  Dyslipidemia  Hypothyroidism    PLAN OF CARE:     1. Abnormal EKG -normal stress echo.  Tolerating activity without limitations.  We will continue to increase activity as tolerated.  2. Obesity " -optimize nutritional choices and increase activity.  After negative stress study we discussed his desire to increase the activity and exercise regimen.  He will continue to increase as tolerated.  3. Dyslipidemia -currently on Zetia.  We will review lipid surveillance.  4. Hypothyroidism    I will see the patient as needed.             Discharge Medications          Accurate as of July 13, 2021  2:52 PM. If you have any questions, ask your nurse or doctor.            Continue These Medications      Instructions Start Date   Cholecalciferol 50 MCG (2000 UT) capsule   2,000 Units, Oral, Daily, One PO daily      desvenlafaxine 50 MG 24 hr tablet  Commonly known as: Pristiq   50 mg, Oral, Daily, For depression; stop Lexapro      ezetimibe 10 MG tablet  Commonly known as: ZETIA   10 mg, Oral, Daily, for cholesterol      folic acid 1 MG tablet  Commonly known as: FOLVITE   1 mg, Oral, Daily      levothyroxine 75 MCG tablet  Commonly known as: SYNTHROID, LEVOTHROID   75 mcg, Oral, Daily, For thyroid      Vitamin B-12 1000 MCG sublingual tablet   One SL daily             Thank you for allowing me to participate in the care of your patient,      Sincerely,   Viet Kumari MD   [facility]  07/13/21  14:52 EDT

## 2021-07-23 ENCOUNTER — OFFICE VISIT (OUTPATIENT)
Dept: FAMILY MEDICINE CLINIC | Facility: CLINIC | Age: 56
End: 2021-07-23

## 2021-07-23 VITALS
SYSTOLIC BLOOD PRESSURE: 122 MMHG | BODY MASS INDEX: 37.35 KG/M2 | RESPIRATION RATE: 16 BRPM | DIASTOLIC BLOOD PRESSURE: 80 MMHG | OXYGEN SATURATION: 98 % | TEMPERATURE: 97.5 F | HEART RATE: 68 BPM | HEIGHT: 74 IN | WEIGHT: 291 LBS

## 2021-07-23 DIAGNOSIS — E03.9 ACQUIRED HYPOTHYROIDISM: ICD-10-CM

## 2021-07-23 DIAGNOSIS — F33.42 RECURRENT MAJOR DEPRESSIVE DISORDER, IN FULL REMISSION (HCC): Primary | ICD-10-CM

## 2021-07-23 DIAGNOSIS — E53.8 LOW FOLIC ACID: ICD-10-CM

## 2021-07-23 DIAGNOSIS — E78.2 MIXED HYPERLIPIDEMIA: ICD-10-CM

## 2021-07-23 PROCEDURE — 99213 OFFICE O/P EST LOW 20 MIN: CPT | Performed by: PHYSICIAN ASSISTANT

## 2021-07-23 RX ORDER — DESVENLAFAXINE 100 MG/1
100 TABLET, EXTENDED RELEASE ORAL DAILY
Qty: 90 TABLET | Refills: 0 | Status: SHIPPED | OUTPATIENT
Start: 2021-07-23 | End: 2021-10-03

## 2021-07-23 NOTE — PATIENT INSTRUCTIONS

## 2021-08-27 ENCOUNTER — OFFICE VISIT (OUTPATIENT)
Dept: FAMILY MEDICINE CLINIC | Facility: CLINIC | Age: 56
End: 2021-08-27

## 2021-08-27 VITALS
BODY MASS INDEX: 37.09 KG/M2 | WEIGHT: 289 LBS | HEIGHT: 74 IN | HEART RATE: 70 BPM | SYSTOLIC BLOOD PRESSURE: 110 MMHG | DIASTOLIC BLOOD PRESSURE: 68 MMHG | OXYGEN SATURATION: 99 % | RESPIRATION RATE: 16 BRPM | TEMPERATURE: 97.5 F

## 2021-08-27 DIAGNOSIS — E55.9 VITAMIN D DEFICIENCY DISEASE: ICD-10-CM

## 2021-08-27 DIAGNOSIS — F33.42 RECURRENT MAJOR DEPRESSIVE DISORDER, IN FULL REMISSION (HCC): Primary | ICD-10-CM

## 2021-08-27 DIAGNOSIS — E03.9 ACQUIRED HYPOTHYROIDISM: ICD-10-CM

## 2021-08-27 DIAGNOSIS — N52.9 ERECTILE DYSFUNCTION, UNSPECIFIED ERECTILE DYSFUNCTION TYPE: ICD-10-CM

## 2021-08-27 DIAGNOSIS — E78.2 MIXED HYPERLIPIDEMIA: ICD-10-CM

## 2021-08-27 PROCEDURE — 99213 OFFICE O/P EST LOW 20 MIN: CPT | Performed by: PHYSICIAN ASSISTANT

## 2021-08-27 RX ORDER — SILDENAFIL CITRATE 20 MG/1
TABLET ORAL
Qty: 30 TABLET | Refills: 5 | Status: SHIPPED | OUTPATIENT
Start: 2021-08-27 | End: 2022-08-01 | Stop reason: SDUPTHER

## 2021-08-27 NOTE — PROGRESS NOTES
"Subjective   Reynaldo Madden is a 55 y.o. male.     History of Present Illness   Reynaldo Madden male 55 y.o., /72 (BP Location: Left arm, Patient Position: Sitting, Cuff Size: Adult)   Pulse 70   Temp 97.5 °F (36.4 °C)   Resp 16   Ht 188 cm (74.02\")   Wt 131 kg (289 lb)   SpO2 99%   BMI 37.09 kg/m²   who presents today for follow up of Depression.  He reports still doing well on Pristiq---not much change on 100mg Pristiq----went up last time.  Pristiq is about the same as Lexapro. Onset of symptoms was approximately several years ago.  He denies current suicidal and homicidal ideation. Risk factors are family history of anxiety and or depression and lifestyle of multiple roles.  Previous treatment includes current Rx.  He complains of the following medication side effects: sexual dysfunction. The patient has previously been in counseling..  Working on eating better.  Weight down    NOTES:  Did see cardio------DR Kumari 7-13-21---neg stress echo---had work up abn EKG--neg  Statins raise his LFTs; on Zetia  He did see nephrology and I do want a note that I am expected that his creatinine will run normally around 1.4-1.5.  The rest of his work-up was negative.    Did labs last visit  Add A1C. Thyroid labs now at treatment goal.  Labs show low Vitamin D levels.  I want you to add OTC Vitamin D 5,000 I.U. Daily.  Need to know if have been taking B12 1,000mcg daily?  If yes, will advise injections. Folate is great.   CBC not done. Can do next labs. Renal labs in baselines.  The following portions of the patient's history were reviewed and updated as appropriate: allergies, current medications, past family history, past medical history, past social history, past surgical history and problem list.    Review of Systems   Constitutional: Negative for activity change, appetite change and unexpected weight change.   HENT: Negative for nosebleeds and trouble swallowing.    Eyes: Negative for pain and visual " disturbance.   Respiratory: Negative for chest tightness, shortness of breath and wheezing.    Cardiovascular: Negative for chest pain and palpitations.   Gastrointestinal: Negative for abdominal pain and blood in stool.   Endocrine: Negative.    Genitourinary: Negative for difficulty urinating and hematuria.   Musculoskeletal: Negative for joint swelling.   Skin: Negative for color change and rash.   Allergic/Immunologic: Negative.    Neurological: Negative for syncope and speech difficulty.   Hematological: Negative for adenopathy.   Psychiatric/Behavioral: Negative for agitation and confusion.   All other systems reviewed and are negative.      Objective   Physical Exam  Vitals and nursing note reviewed.   Constitutional:       General: He is not in acute distress.     Appearance: He is well-developed. He is not diaphoretic.   HENT:      Head: Normocephalic.   Eyes:      General: No scleral icterus.     Conjunctiva/sclera: Conjunctivae normal.      Pupils: Pupils are equal, round, and reactive to light.   Cardiovascular:      Rate and Rhythm: Normal rate and regular rhythm.      Heart sounds: Normal heart sounds. No murmur heard.     Pulmonary:      Effort: Pulmonary effort is normal.      Breath sounds: Normal breath sounds.   Musculoskeletal:         General: Normal range of motion.      Cervical back: Normal range of motion and neck supple.   Skin:     General: Skin is warm and dry.      Findings: No rash.   Neurological:      Mental Status: He is alert and oriented to person, place, and time.   Psychiatric:         Mood and Affect: Affect is not inappropriate.         Behavior: Behavior normal.         Thought Content: Thought content normal.         Judgment: Judgment normal.         Assessment/Plan   Diagnoses and all orders for this visit:    1. Recurrent major depressive disorder, in full remission (CMS/[facility]) (Primary)    2. Mixed hyperlipidemia    3. Vitamin D deficiency disease    4. Acquired  hypothyroidism    5. Erectile dysfunction, unspecified erectile dysfunction type    Other orders  -     sildenafil (REVATIO) 20 MG tablet; 2-5 tabs PO once daily PRN ED  Dispense: 30 tablet; Refill: 5      Stay on thyroid Rx  Get weight down  Labs show low Vitamin D levels.  I want you to add OTC Vitamin D 5,000 I.U. Daily.  Vitamin B12 is in lower range.  Start over the counter B12 1,000 mcg daily.  Will Rx Viagra for ED

## 2021-08-27 NOTE — PATIENT INSTRUCTIONS
Sildenafil tablets (Erectile Dysfunction)  What is this medicine?  SILDENAFIL (je DEN a imani) is used to treat erection problems in men.  This medicine may be used for other purposes; ask your health care provider or pharmacist if you have questions.  COMMON BRAND NAME(S): Viagra  What should I tell my health care provider before I take this medicine?  They need to know if you have any of these conditions:  · bleeding disorders  · eye or vision problems, including a rare inherited eye disease called retinitis pigmentosa  · anatomical deformation of the penis, Peyronie's disease, or history of priapism (painful and prolonged erection)  · heart disease, angina, a history of heart attack, irregular heart beats, or other heart problems  · high or low blood pressure  · history of blood diseases, like sickle cell anemia or leukemia  · history of stomach bleeding  · kidney disease  · liver disease  · stroke  · an unusual or allergic reaction to sildenafil, other medicines, foods, dyes, or preservatives  · pregnant or trying to get pregnant  · breast-feeding  How should I use this medicine?  Take this medicine by mouth with a glass of water. Follow the directions on the prescription label. The dose is usually taken 1 hour before sexual activity. You should not take the dose more than once per day. Do not take your medicine more often than directed.  Talk to your pediatrician regarding the use of this medicine in children. This medicine is not used in children for this condition.  Overdosage: If you think you have taken too much of this medicine contact a poison control center or emergency room at once.  NOTE: This medicine is only for you. Do not share this medicine with others.  What if I miss a dose?  This does not apply. Do not take double or extra doses.  What may interact with this medicine?  Do not take this medicine with any of the following medications:  · cisapride  · nitrates like amyl nitrite, isosorbide  dinitrate, isosorbide mononitrate, nitroglycerin  · riociguat  This medicine may also interact with the following medications:  · antiviral medicines for HIV or AIDS  · bosentan  · certain medicines for benign prostatic hyperplasia (BPH)  · certain medicines for blood pressure  · certain medicines for fungal infections like ketoconazole and itraconazole  · cimetidine  · erythromycin  · rifampin  This list may not describe all possible interactions. Give your health care provider a list of all the medicines, herbs, non-prescription drugs, or dietary supplements you use. Also tell them if you smoke, drink alcohol, or use illegal drugs. Some items may interact with your medicine.  What should I watch for while using this medicine?  If you notice any changes in your vision while taking this drug, call your doctor or health care professional as soon as possible. Stop using this medicine and call your health care provider right away if you have a loss of sight in one or both eyes.  Contact your doctor or health care professional right away if you have an erection that lasts longer than 4 hours or if it becomes painful. This may be a sign of a serious problem and must be treated right away to prevent permanent damage.  If you experience symptoms of nausea, dizziness, chest pain or arm pain upon initiation of sexual activity after taking this medicine, you should refrain from further activity and call your doctor or health care professional as soon as possible.  Do not drink alcohol to excess (examples, 5 glasses of wine or 5 shots of whiskey) when taking this medicine. When taken in excess, alcohol can increase your chances of getting a headache or getting dizzy, increasing your heart rate or lowering your blood pressure.  Using this medicine does not protect you or your partner against HIV infection (the virus that causes AIDS) or other sexually transmitted diseases.  What side effects may I notice from receiving this  medicine?  Side effects that you should report to your doctor or health care professional as soon as possible:  · allergic reactions like skin rash, itching or hives, swelling of the face, lips, or tongue  · breathing problems  · changes in hearing  · changes in vision  · chest pain  · fast, irregular heartbeat  · prolonged or painful erection  · seizures  Side effects that usually do not require medical attention (report to your doctor or health care professional if they continue or are bothersome):  · back pain  · dizziness  · flushing  · headache  · indigestion  · muscle aches  · nausea  · stuffy or runny nose  This list may not describe all possible side effects. Call your doctor for medical advice about side effects. You may report side effects to FDA at 8-255-FDA-2407.  Where should I keep my medicine?  Keep out of reach of children.  Store at room temperature between 15 and 30 degrees C (59 and 86 degrees F). Throw away any unused medicine after the expiration date.  NOTE: This sheet is a summary. It may not cover all possible information. If you have questions about this medicine, talk to your doctor, pharmacist, or health care provider.  © 2021 [employer] (2016-11-30 12:00:25)

## 2021-09-14 RX ORDER — LEVOTHYROXINE SODIUM 0.07 MG/1
TABLET ORAL
Qty: 90 TABLET | Refills: 3 | Status: SHIPPED | OUTPATIENT
Start: 2021-09-14 | End: 2022-03-24

## 2021-10-03 RX ORDER — DESVENLAFAXINE 100 MG/1
TABLET, EXTENDED RELEASE ORAL
Qty: 90 TABLET | Refills: 3 | Status: SHIPPED | OUTPATIENT
Start: 2021-10-03 | End: 2022-06-17 | Stop reason: SDUPTHER

## 2021-11-30 ENCOUNTER — OFFICE VISIT (OUTPATIENT)
Dept: FAMILY MEDICINE CLINIC | Facility: CLINIC | Age: 56
End: 2021-11-30

## 2021-11-30 VITALS
OXYGEN SATURATION: 99 % | WEIGHT: 286 LBS | BODY MASS INDEX: 36.7 KG/M2 | HEIGHT: 74 IN | TEMPERATURE: 97.5 F | HEART RATE: 72 BPM | RESPIRATION RATE: 16 BRPM | SYSTOLIC BLOOD PRESSURE: 110 MMHG | DIASTOLIC BLOOD PRESSURE: 66 MMHG

## 2021-11-30 DIAGNOSIS — Z12.5 SCREENING PSA (PROSTATE SPECIFIC ANTIGEN): ICD-10-CM

## 2021-11-30 DIAGNOSIS — E78.2 MIXED HYPERLIPIDEMIA: ICD-10-CM

## 2021-11-30 DIAGNOSIS — E53.8 LOW SERUM VITAMIN B12: ICD-10-CM

## 2021-11-30 DIAGNOSIS — R73.01 IMPAIRED FASTING GLUCOSE: ICD-10-CM

## 2021-11-30 DIAGNOSIS — E03.9 ACQUIRED HYPOTHYROIDISM: ICD-10-CM

## 2021-11-30 DIAGNOSIS — E55.9 VITAMIN D DEFICIENCY DISEASE: ICD-10-CM

## 2021-11-30 DIAGNOSIS — F33.42 RECURRENT MAJOR DEPRESSIVE DISORDER, IN FULL REMISSION (HCC): Primary | ICD-10-CM

## 2021-11-30 PROCEDURE — 99214 OFFICE O/P EST MOD 30 MIN: CPT | Performed by: PHYSICIAN ASSISTANT

## 2021-11-30 NOTE — PROGRESS NOTES
"Subjective   Reynaldo Madden is a 56 y.o. male.     History of Present Illness   Reynaldo Madden male 56 y.o., /80 (BP Location: Left arm, Patient Position: Sitting, Cuff Size: Adult)   Pulse 72   Temp 97.5 °F (36.4 °C)   Resp 16   Ht 188 cm (74.02\")   Wt 130 kg (286 lb)   SpO2 99%   BMI 36.70 kg/m²   who presents today for follow up of Depression.  He reports medication is working well, patient desires to continue on Rx, and needs refill. Onset of symptoms was approximately several years ago. He denies current suicidal and homicidal ideation. Risk factors are family history of anxiety and or depression and lifestyle of multiple roles.  Previous treatment includes current Rx. He complains of the following medication side effects:still some sexual side effects--less on this; some weird dreams. The patient is currently in counseling..  PHQ 9 is now 3 today. But some stresses at work and with [employer].  Weight is down   Less sexual side effects.  Today is f/u from changing Lexapro to Pristiq from 50mg to 100mg  Changed d/t sexual side effects    Do want labs this visit; note renal labs last time in baselines and D and B12 low    Did see cardio------DR Kumari 7-13-21---neg stress echo---had work up abn EKG--neg  Statins raise his LFTs; on Zetia  He did see nephrology and I do want a note that I am expected that his creatinine will run normally around 1.4-1.5.  The rest of his work-up was negative.  The following portions of the patient's history were reviewed and updated as appropriate: allergies, current medications, past family history, past medical history, past social history, past surgical history and problem list.    Review of Systems   Constitutional: Negative for activity change, appetite change and unexpected weight change.   HENT: Negative for nosebleeds and trouble swallowing.    Eyes: Negative for pain and visual disturbance.   Respiratory: Negative for chest tightness, shortness of breath and " wheezing.    Cardiovascular: Negative for chest pain and palpitations.   Gastrointestinal: Negative for abdominal pain and blood in stool.   Endocrine: Negative.    Genitourinary: Negative for difficulty urinating and hematuria.   Musculoskeletal: Negative for joint swelling.   Skin: Negative for color change and rash.   Allergic/Immunologic: Negative.    Neurological: Negative for syncope and speech difficulty.   Hematological: Negative for adenopathy.   Psychiatric/Behavioral: Negative for agitation and confusion.   All other systems reviewed and are negative.      Objective   Physical Exam  Vitals and nursing note reviewed.   Constitutional:       General: He is not in acute distress.     Appearance: He is well-developed. He is obese. He is not diaphoretic.   HENT:      Head: Normocephalic.   Eyes:      General: No scleral icterus.     Conjunctiva/sclera: Conjunctivae normal.      Pupils: Pupils are equal, round, and reactive to light.   Cardiovascular:      Rate and Rhythm: Normal rate and regular rhythm.      Heart sounds: Normal heart sounds. No murmur heard.      Pulmonary:      Effort: Pulmonary effort is normal.      Breath sounds: Normal breath sounds.   Musculoskeletal:         General: Normal range of motion.      Cervical back: Normal range of motion and neck supple.   Skin:     General: Skin is warm and dry.      Findings: No rash.   Neurological:      Mental Status: He is alert and oriented to person, place, and time.   Psychiatric:         Mood and Affect: Affect is not inappropriate.         Behavior: Behavior normal.         Thought Content: Thought content normal.         Judgment: Judgment normal.         Assessment/Plan   Diagnoses and all orders for this visit:    1. Recurrent major depressive disorder, in full remission (HCC) (Primary)  -     Comprehensive metabolic panel; Future  -     Lipid panel; Future  -     CBC and Differential; Future  -     TSH; Future  -     Hemoglobin A1c; Future  -      PSA Screen; Future  -     T4, Free; Future  -     T3, Free; Future  -     Vitamin D 25 Hydroxy; Future  -     Vitamin B12; Future  -     Folate; Future    2. Mixed hyperlipidemia  -     Comprehensive metabolic panel; Future  -     Lipid panel; Future  -     CBC and Differential; Future  -     TSH; Future  -     Hemoglobin A1c; Future  -     PSA Screen; Future  -     T4, Free; Future  -     T3, Free; Future  -     Vitamin D 25 Hydroxy; Future  -     Vitamin B12; Future  -     Folate; Future    3. Vitamin D deficiency disease  -     Comprehensive metabolic panel; Future  -     Lipid panel; Future  -     CBC and Differential; Future  -     TSH; Future  -     Hemoglobin A1c; Future  -     PSA Screen; Future  -     T4, Free; Future  -     T3, Free; Future  -     Vitamin D 25 Hydroxy; Future  -     Vitamin B12; Future  -     Folate; Future    4. Acquired hypothyroidism  -     Comprehensive metabolic panel; Future  -     Lipid panel; Future  -     CBC and Differential; Future  -     TSH; Future  -     Hemoglobin A1c; Future  -     PSA Screen; Future  -     T4, Free; Future  -     T3, Free; Future  -     Vitamin D 25 Hydroxy; Future  -     Vitamin B12; Future  -     Folate; Future    5. Low serum vitamin B12  -     Comprehensive metabolic panel; Future  -     Lipid panel; Future  -     CBC and Differential; Future  -     TSH; Future  -     Hemoglobin A1c; Future  -     PSA Screen; Future  -     T4, Free; Future  -     T3, Free; Future  -     Vitamin D 25 Hydroxy; Future  -     Vitamin B12; Future  -     Folate; Future    6. Screening PSA (prostate specific antigen)  -     Comprehensive metabolic panel; Future  -     Lipid panel; Future  -     CBC and Differential; Future  -     TSH; Future  -     Hemoglobin A1c; Future  -     PSA Screen; Future  -     T4, Free; Future  -     T3, Free; Future  -     Vitamin D 25 Hydroxy; Future  -     Vitamin B12; Future  -     Folate; Future    7. Impaired fasting glucose  -      Comprehensive metabolic panel; Future  -     Lipid panel; Future  -     CBC and Differential; Future  -     TSH; Future  -     Hemoglobin A1c; Future  -     PSA Screen; Future  -     T4, Free; Future  -     T3, Free; Future  -     Vitamin D 25 Hydroxy; Future  -     Vitamin B12; Future  -     Folate; Future        PHQ 9 is now 3  Note ---had cardio and nephrology---neg work up  Labs due [place]  bp great today  No statin --raises his LFTs

## 2022-03-24 ENCOUNTER — OFFICE VISIT (OUTPATIENT)
Dept: FAMILY MEDICINE CLINIC | Facility: CLINIC | Age: 57
End: 2022-03-24

## 2022-03-24 VITALS
BODY MASS INDEX: 36.83 KG/M2 | RESPIRATION RATE: 16 BRPM | OXYGEN SATURATION: 99 % | SYSTOLIC BLOOD PRESSURE: 110 MMHG | DIASTOLIC BLOOD PRESSURE: 70 MMHG | HEIGHT: 74 IN | HEART RATE: 75 BPM | TEMPERATURE: 97.5 F | WEIGHT: 287 LBS

## 2022-03-24 DIAGNOSIS — E66.01 MORBIDLY OBESE: ICD-10-CM

## 2022-03-24 DIAGNOSIS — E55.9 VITAMIN D DEFICIENCY DISEASE: ICD-10-CM

## 2022-03-24 DIAGNOSIS — E53.8 LOW SERUM VITAMIN B12: ICD-10-CM

## 2022-03-24 DIAGNOSIS — E03.9 ACQUIRED HYPOTHYROIDISM: Primary | ICD-10-CM

## 2022-03-24 DIAGNOSIS — E53.8 LOW FOLIC ACID: ICD-10-CM

## 2022-03-24 DIAGNOSIS — F33.42 RECURRENT MAJOR DEPRESSIVE DISORDER, IN FULL REMISSION: ICD-10-CM

## 2022-03-24 DIAGNOSIS — E78.2 MIXED HYPERLIPIDEMIA: ICD-10-CM

## 2022-03-24 PROCEDURE — 99214 OFFICE O/P EST MOD 30 MIN: CPT | Performed by: PHYSICIAN ASSISTANT

## 2022-03-24 RX ORDER — MAGNESIUM 200 MG
TABLET ORAL
Qty: 90 EACH | Refills: 3 | Status: SHIPPED | OUTPATIENT
Start: 2022-03-24 | End: 2022-08-01 | Stop reason: SDUPTHER

## 2022-03-24 RX ORDER — EZETIMIBE 10 MG/1
10 TABLET ORAL DAILY
Qty: 90 TABLET | Refills: 3 | Status: SHIPPED | OUTPATIENT
Start: 2022-03-24 | End: 2022-08-01 | Stop reason: SDUPTHER

## 2022-03-24 RX ORDER — LEVOTHYROXINE SODIUM 0.1 MG/1
TABLET ORAL
Qty: 90 TABLET | Refills: 3 | Status: SHIPPED | OUTPATIENT
Start: 2022-03-24 | End: 2022-07-05 | Stop reason: SDUPTHER

## 2022-03-24 NOTE — PROGRESS NOTES
"Subjective   Reynaldo Madden is a 56 y.o. male.     History of Present Illness      Since the last visit, he has overall felt fairly well.  He has Hyperlipidemia and will start medication plan for treatment.  I will order follow up labs, Hypothyroidism with review of labs today and adjustment made in medication doseage with follow up labs ordered and Vitamin D deficiency and will update labs for continued management.  he has been compliant with current medications have reviewed them.  The patient denies medication side effects.  Will refill medications. /82 (BP Location: Left arm, Patient Position: Sitting, Cuff Size: Adult)   Pulse 75   Temp 97.5 °F (36.4 °C)   Resp 16   Ht 188 cm (74.02\")   Wt 130 kg (287 lb)   SpO2 99%   BMI 36.83 kg/m²     Results for orders placed or performed in visit on 01/03/22   Comprehensive metabolic panel    Specimen: Blood   Result Value Ref Range    Glucose 85 65 - 99 mg/dL    BUN 13 6 - 24 mg/dL    Creatinine 1.38 (H) 0.76 - 1.27 mg/dL    EGFR Result 60 >59 mL/min/1.73    BUN/Creatinine Ratio 9 9 - 20    Sodium 141 134 - 144 mmol/L    Potassium 4.5 3.5 - 5.2 mmol/L    Chloride 104 96 - 106 mmol/L    Total CO2 23 20 - 29 mmol/L    Calcium 9.4 8.7 - 10.2 mg/dL    Total Protein 7.2 6.0 - 8.5 g/dL    Albumin 4.2 3.8 - 4.9 g/dL    Globulin 3.0 1.5 - 4.5 g/dL    A/G Ratio 1.4 1.2 - 2.2    Total Bilirubin 0.4 0.0 - 1.2 mg/dL    Alkaline Phosphatase 102 44 - 121 IU/L    AST (SGOT) 18 0 - 40 IU/L    ALT (SGPT) 23 0 - 44 IU/L   Lipid panel    Specimen: Blood   Result Value Ref Range    Total Cholesterol 230 (H) 100 - 199 mg/dL    Triglycerides 92 0 - 149 mg/dL    HDL Cholesterol 46 >39 mg/dL    VLDL Cholesterol William 16 5 - 40 mg/dL    LDL Chol Calc (NIH) 168 (H) 0 - 99 mg/dL   TSH    Specimen: Blood   Result Value Ref Range    TSH 4.760 (H) 0.450 - 4.500 uIU/mL   Hemoglobin A1c    Specimen: Blood   Result Value Ref Range    Hemoglobin A1C 5.4 4.8 - 5.6 %   PSA Screen    Specimen: " Blood   Result Value Ref Range    PSA 1.4 0.0 - 4.0 ng/mL   T4, Free    Specimen: Blood   Result Value Ref Range    Free T4 1.40 0.82 - 1.77 ng/dL   T3, Free    Specimen: Blood   Result Value Ref Range    T3, Free 2.5 2.0 - 4.4 pg/mL   Vitamin D 25 Hydroxy    Specimen: Blood   Result Value Ref Range    25 Hydroxy, Vitamin D 10.9 (L) 30.0 - 100.0 ng/mL   Vitamin B12    Specimen: Blood   Result Value Ref Range    Vitamin B-12 342 232 - 1,245 pg/mL   Folate    Specimen: Blood   Result Value Ref Range    Folate 7.8 >3.0 ng/mL   CBC and Differential    Specimen: Blood   Result Value Ref Range    WBC 6.0 3.4 - 10.8 x10E3/uL    RBC 4.78 4.14 - 5.80 x10E6/uL    Hemoglobin 14.8 13.0 - 17.7 g/dL    Hematocrit 43.2 37.5 - 51.0 %    MCV 90 79 - 97 fL    MCH 31.0 26.6 - 33.0 pg    MCHC 34.3 31.5 - 35.7 g/dL    RDW 12.6 11.6 - 15.4 %    Platelets 277 150 - 450 x10E3/uL    Neutrophil Rel % 54 Not Estab. %    Lymphocyte Rel % 35 Not Estab. %    Monocyte Rel % 7 Not Estab. %    Eosinophil Rel % 3 Not Estab. %    Basophil Rel % 1 Not Estab. %    Neutrophils Absolute 3.2 1.4 - 7.0 x10E3/uL    Lymphocytes Absolute 2.1 0.7 - 3.1 x10E3/uL    Monocytes Absolute 0.4 0.1 - 0.9 x10E3/uL    Eosinophils Absolute 0.2 0.0 - 0.4 x10E3/uL    Basophils Absolute 0.0 0.0 - 0.2 x10E3/uL    Immature Granulocyte Rel % 0 Not Estab. %    Immature Grans Absolute 0.0 0.0 - 0.1 x10E3/uL   Statins raise his LFTs; on Zetia    Also following up on labs from 3/17/2022 that lipids are elevated and kidney functions were in baseline and question missed doses of thyroid medication due to TSH slightly above range.  Also question if he is taking B12 and vitamin D?    f/u from changing Lexapro to Pristiq from 50mg to 100mg  Changed d/t sexual side effects.  Made notes on 11/30/2021 that his PHQ-9 was down to a 3 and the Pristiq 100 mg is working well to control depression  Did see cardio------DR Kumari 7-13-21---neg stress echo---had work up abn EKG--neg  Statins  raise his LFTs; on Zetia  He did see nephrology and I do want a note that I am expected that his creatinine will run normally around 1.4-1.5.  The rest of his work-up was negative.  The following portions of the patient's history were reviewed and updated as appropriate: allergies, current medications, past family history, past medical history, past social history, past surgical history and problem list.    Review of Systems   Constitutional: Negative for activity change, appetite change and unexpected weight change.   HENT: Negative for nosebleeds and trouble swallowing.    Eyes: Negative for pain and visual disturbance.   Respiratory: Negative for chest tightness, shortness of breath and wheezing.    Cardiovascular: Negative for chest pain and palpitations.   Gastrointestinal: Negative for abdominal pain and blood in stool.   Endocrine: Negative.    Genitourinary: Negative for difficulty urinating and hematuria.   Musculoskeletal: Negative for joint swelling.   Skin: Negative for color change and rash.   Allergic/Immunologic: Negative.    Neurological: Positive for numbness. Negative for syncope and speech difficulty.   Hematological: Negative for adenopathy.   Psychiatric/Behavioral: Negative for agitation and confusion.   All other systems reviewed and are negative.      Objective   Physical Exam  Vitals and nursing note reviewed.   Constitutional:       General: He is not in acute distress.     Appearance: He is well-developed. He is not diaphoretic.   HENT:      Head: Normocephalic.   Eyes:      Conjunctiva/sclera: Conjunctivae normal.      Pupils: Pupils are equal, round, and reactive to light.   Neck:      Vascular: No carotid bruit.   Cardiovascular:      Rate and Rhythm: Normal rate and regular rhythm.      Heart sounds: Normal heart sounds. No murmur heard.  Pulmonary:      Effort: Pulmonary effort is normal.      Breath sounds: Normal breath sounds.   Musculoskeletal:         General: Normal range of  motion.      Cervical back: Normal range of motion and neck supple.      Right lower leg: No edema.      Left lower leg: No edema.   Skin:     General: Skin is warm and dry.      Coloration: Skin is not jaundiced.      Findings: No rash.   Neurological:      General: No focal deficit present.      Mental Status: He is alert and oriented to person, place, and time.   Psychiatric:         Mood and Affect: Mood normal. Affect is not inappropriate.         Behavior: Behavior normal.         Thought Content: Thought content normal.         Judgment: Judgment normal.         Assessment/Plan   Diagnoses and all orders for this visit:    1. Acquired hypothyroidism (Primary)    2. Recurrent major depressive disorder, in full remission (HCC)    3. Low serum vitamin B12    4. Low folic acid    5. Vitamin D deficiency disease    6. Morbidly obese (HCC)    Other orders  -     Cyanocobalamin (Vitamin B-12) 1000 MCG sublingual tablet; One SL daily  Dispense: 90 each; Refill: 3  -     vitamin D3 (vitamin d) 125 MCG (5000 UT) capsule capsule; One PO daily  Dispense: 90 capsule; Refill: 3      Statins raise his LFTs; on Zetia  Must get weight down he has morbid obesity with significant risk factors including his mixed hyperlipidemia and unable to take a statin due to raise his liver enzymes--does need to lose weight consider starting Wegovy--no history of pancreatitis and no family history of thyroid cancer or MEN  He needs to start taking the B12 daily at 1000 mcg and vitamin D 5000 IU daily and also continue taking folic acid plan labs to follow-up on this in 3 months  Will raise dose thyroid Rx due to TSH slightly above range with labs also 3 months  Plan, Reynaldo Madden, was seen today.  he was seen for Hyperlipidemia and will work on this with diet and exercise, Hypothyroidism with abnormal labs and new medication regimen and Vitamin D deficiency and will update labs .

## 2022-03-24 NOTE — PATIENT INSTRUCTIONS
Fat and Cholesterol Restricted Eating Plan  Eating a diet that limits fat and cholesterol may help lower your risk for heart disease and other conditions. Your body needs fat and cholesterol for basic functions, but eating too much of these things can be harmful to your health.  Your health care provider may order lab tests to check your blood fat (lipid) and cholesterol levels. This helps your health care provider understand your risk for certain conditions and whether you need to make diet changes. Work with your health care provider or dietitian to make an eating plan that is right for you.  Your plan includes:  Limit your fat intake to ______% or less of your total calories a day.  Limit your saturated fat intake to ______% or less of your total calories a day.  Limit the amount of cholesterol in your diet to less than _________mg a day.  Eat ___________ g of fiber a day.  What are tips for following this plan?  General guidelines    If you are overweight, work with your health care provider to lose weight safely. Losing just 5-10% of your body weight can improve your overall health and help prevent diseases such as diabetes and heart disease.  Avoid:  Foods with added sugar.  Fried foods.  Foods that contain partially hydrogenated oils, including stick margarine, some tub margarines, cookies, crackers, and other baked goods.  Limit alcohol intake to no more than 1 drink a day for nonpregnant women and 2 drinks a day for men. One drink equals 12 oz of beer, 5 oz of wine, or 1½ oz of hard liquor.    Reading food labels  Check food labels for:  Trans fats, partially hydrogenated oils, or high amounts of saturated fat. Avoid foods that contain saturated fat and trans fat.  The amount of cholesterol in each serving. Try to eat no more than 200 mg of cholesterol each day.  The amount of fiber in each serving. Try to eat at least 20-30 g of fiber each day.  Choose foods with healthy fats, such as:  Monounsaturated and  "polyunsaturated fats. These include olive and canola oil, flaxseeds, walnuts, almonds, and seeds.  Omega-3 fats. These are found in foods such as salmon, mackerel, sardines, tuna, flaxseed oil, and ground flaxseeds.  Choose grain products that have whole grains. Look for the word \"whole\" as the first word in the ingredient list.  Cooking  Cook foods using methods other than frying. Baking, boiling, grilling, and broiling are some healthy options.  Eat more home-cooked food and less restaurant, buffet, and fast food.  Avoid cooking using saturated fats.  Animal sources of saturated fats include meats, butter, and cream.  Plant sources of saturated fats include palm oil, palm kernel oil, and coconut oil.  Meal planning    At meals, imagine dividing your plate into fourths:  Fill one-half of your plate with vegetables and green salads.  Fill one-fourth of your plate with whole grains.  Fill one-fourth of your plate with lean protein foods.  Eat fish that is high in omega-3 fats at least two times a week.  Eat more foods that contain fiber, such as whole grains, beans, apples, broccoli, carrots, peas, and barley. These foods help promote healthy cholesterol levels in the blood.    Recommended foods  Grains  Whole grains, such as whole wheat or whole grain breads, crackers, cereals, and pasta. Unsweetened oatmeal, bulgur, barley, quinoa, or brown rice. Corn or whole wheat flour tortillas.  Vegetables  Fresh or frozen vegetables (raw, steamed, roasted, or grilled). Green salads.  Fruits  All fresh, canned (in natural juice), or frozen fruits.  Meats and other protein foods  Ground beef (85% or leaner), grass-fed beef, or beef trimmed of fat. Skinless chicken or turkey. Ground chicken or turkey. Pork trimmed of fat. All fish and seafood. Egg whites. Dried beans, peas, or lentils. Unsalted nuts or seeds. Unsalted canned beans. Natural nut butters without added sugar and oil.  Dairy  Low-fat or nonfat dairy products, such as " skim or 1% milk, 2% or reduced-fat cheeses, low-fat and fat-free ricotta or cottage cheese, or plain low-fat and nonfat yogurt.  Fats and oils  Tub margarine without trans fats. Light or reduced-fat mayonnaise and salad dressings. Avocado. Olive, canola, sesame, or safflower oils.  The items listed above may not be a complete list of recommended foods or beverages. Contact your dietitian for more options.  Foods to avoid  Grains  White bread. White pasta. White rice. Cornbread. Bagels, pastries, and croissants. Crackers and snack foods that contain trans fat and hydrogenated oils.  Vegetables  Vegetables cooked in cheese, cream, or butter sauce. Fried vegetables.  Fruits  Canned fruit in heavy syrup. Fruit in cream or butter sauce. Fried fruit.  Meats and other protein foods  Fatty cuts of meat. Ribs, chicken wings, khan, sausage, bologna, salami, chitterlings, fatback, hot dogs, bratwurst, and packaged lunch meats. Liver and organ meats. Whole eggs and egg yolks. Chicken and turkey with skin. Fried meat.  Dairy  Whole or 2% milk, cream, half-and-half, and cream cheese. Whole milk cheeses. Whole-fat or sweetened yogurt. Full-fat cheeses. Nondairy creamers and whipped toppings. Processed cheese, cheese spreads, and cheese curds.  Beverages  Alcohol. Sugar-sweetened drinks such as sodas, lemonade, and fruit drinks.  Fats and oils  Butter, stick margarine, lard, shortening, ghee, or khan fat. Coconut, palm kernel, and palm oils.  Sweets and desserts  Corn syrup, sugars, honey, and molasses. Candy. Jam and jelly. Syrup. Sweetened cereals. Cookies, pies, cakes, donuts, muffins, and ice cream.  The items listed above may not be a complete list of foods and beverages to avoid. Contact your dietitian for more information.  The items listed above may not be a complete list of foods and beverages [you/your child] should avoid. Contact a dietitian for more information.  Summary  Your body needs fat and cholesterol for basic  functions. However, eating too much of these things can be harmful to your health.  Work with your health care provider and dietitian to follow a diet low in fat and cholesterol. Doing this may help lower your risk for heart disease and other conditions.  Choose healthy fats, such as monounsaturated and polyunsaturated fats, and foods high in omega-3 fatty acids.  Eat fiber-rich foods, such as whole grains, beans, peas, fruits, and vegetables.  Limit or avoid alcohol, fried foods, and foods high in saturated fats, partially hydrogenated oils, and sugar.  This information is not intended to replace advice given to you by your health care provider. Make sure you discuss any questions you have with your health care provider.  Document Revised: 07/06/2021 Document Reviewed: 04/21/2021  Elsevier Patient Education © 2021 [employer]

## 2022-06-17 ENCOUNTER — TELEPHONE (OUTPATIENT)
Dept: FAMILY MEDICINE CLINIC | Facility: CLINIC | Age: 57
End: 2022-06-17

## 2022-06-17 RX ORDER — DESVENLAFAXINE 100 MG/1
100 TABLET, EXTENDED RELEASE ORAL DAILY
Qty: 30 TABLET | Refills: 3 | Status: SHIPPED | OUTPATIENT
Start: 2022-06-17 | End: 2022-08-01 | Stop reason: SDUPTHER

## 2022-06-17 NOTE — TELEPHONE ENCOUNTER
Caller: Reynaldo Madden    Relationship: Self    Best call back number: [phone]    What is the best time to reach you: ASAP    Who are you requesting to speak with (clinical staff, provider,  specific staff member): ROSIBEL GHOTRA OR MA    What was the call regarding: PATIENT STATES HE WOULD LIKE TO DISCUSS MEDICATIONS    Do you require a callback: YES

## 2022-06-17 NOTE — TELEPHONE ENCOUNTER
Pt stated he is out of his pirstq he has an appt 6/30. Asking if you could send him a small supply. Pt stated he has been out for a few days stated he is feeling dizzy. Thanks please advise

## 2022-07-05 RX ORDER — LEVOTHYROXINE SODIUM 0.1 MG/1
TABLET ORAL
Qty: 90 TABLET | Refills: 0 | Status: SHIPPED | OUTPATIENT
Start: 2022-07-05 | End: 2022-08-01

## 2022-07-05 NOTE — TELEPHONE ENCOUNTER
Caller: Reynaldo Madden    Relationship: Self    Best call back number: [phone]    Requested Prescriptions:   Requested Prescriptions     Pending Prescriptions Disp Refills   • levothyroxine (SYNTHROID, LEVOTHROID) 100 MCG tablet 90 tablet 3     Sig: One PO daily before breakfast for thyroid        Pharmacy where request should be sent: [id number] [address] [facility] [phone]  - [phone] FX     Additional details provided by patient: 1 PILL LEFT/NEW PHARMACY NO LONGER USING [facility].     Does the patient have less than a 3 day supply:  [x] Yes  [] No    Lizette Hernandez Rep   07/05/22 13:44 EDT

## 2022-07-18 ENCOUNTER — TELEPHONE (OUTPATIENT)
Dept: FAMILY MEDICINE CLINIC | Facility: CLINIC | Age: 57
End: 2022-07-18

## 2022-07-18 NOTE — TELEPHONE ENCOUNTER
I am concerned about his renal functions with doing the antiviral oral therapy.  He may not qualify for the monoclonal antibody infusion.  He would have to have an appointment by video with one of the midlevel providers today to order

## 2022-07-18 NOTE — TELEPHONE ENCOUNTER
Caller: Reynaldo Madden    Relationship to patient: Self    Best call back number:     Date of exposure:     Date of positive COVID19 test: 07/18/22    Date if possible COVID19 exposure:     COVID19 symptoms: FEVER, COUGH, HEADACHE, NAUSEA    Date of initial quarantine:    Additional information or concerns: PATIENT IS CALLING IN STATING THAT HE HAS TESTED POSITIVE FOR COVID THIS MORNING AND HE HAS THE SYMPTOMS LISTED ABOVE.  HE WANTS TO KNOW IF THE ANTIVIRAL MEDICATION CAN BE CALLED IN FOR HIM.      What is the patients preferred pharmacy:    [facility]  [address] [place] [address]  [phone]

## 2022-07-19 ENCOUNTER — TELEMEDICINE (OUTPATIENT)
Dept: FAMILY MEDICINE CLINIC | Facility: CLINIC | Age: 57
End: 2022-07-19

## 2022-07-19 VITALS — HEIGHT: 74 IN | WEIGHT: 287 LBS | BODY MASS INDEX: 36.83 KG/M2

## 2022-07-19 DIAGNOSIS — U07.1 COVID-19: Primary | ICD-10-CM

## 2022-07-19 DIAGNOSIS — R05.9 COUGH: ICD-10-CM

## 2022-07-19 PROCEDURE — 99213 OFFICE O/P EST LOW 20 MIN: CPT | Performed by: NURSE PRACTITIONER

## 2022-07-19 RX ORDER — DOXYCYCLINE 100 MG/1
100 TABLET ORAL 2 TIMES DAILY
Qty: 20 TABLET | Refills: 0 | Status: SHIPPED | OUTPATIENT
Start: 2022-07-19 | End: 2022-07-29

## 2022-07-19 RX ORDER — BENZONATATE 200 MG/1
200 CAPSULE ORAL 3 TIMES DAILY PRN
Qty: 30 CAPSULE | Refills: 0 | Status: SHIPPED | OUTPATIENT
Start: 2022-07-19

## 2022-07-19 NOTE — PROGRESS NOTES
"Chief Complaint  Covid-19 Home Monitoring Video Visit, Cough, Fever, Headache, and Nausea    Subjective        You have chosen to receive care through a telehealth visit.  Do you consent to use a video/audio connection for your medical care today? Yes    Reynaldo presents to [facility]  For a video tele-health visit to discuss testing positive for Covid 19.    56 year old male presented c/o testing positive for Covid 19 on 7/18/2022  Symptom onset was 7/16/2022   Including non productive cough, low grade fever with a tmax of  100.0 relieved by medication  .  HA, and intermittent nausea that has improved  He denies any sob or wheezing, no abdominal pain, vomiting or diarrhea  He is currently taking tylenol  He is able to eat and keep down fluids      The following portions of the patient's history were reviewed and updated as appropriate: allergies, current medications, past family history, past medical history, past social history, past surgical history, and problem list    Review of Systems   Constitutional: Positive for fatigue and fever. Negative for chills.   HENT: Positive for congestion. Negative for sore throat.    Eyes: Negative for visual disturbance.   Respiratory: Positive for cough. Negative for shortness of breath and wheezing.    Cardiovascular: Negative for chest pain, palpitations and leg swelling.   Gastrointestinal: Positive for nausea. Negative for abdominal pain, diarrhea and vomiting.   Skin: Negative for rash.   Neurological: Negative for dizziness and light-headedness.        Objective   Vital Signs: unable to assess  Vitals:    07/19/22 1317   Weight: 130 kg (287 lb)   Height: 188 cm (74.02\")      BMI 36.83   Class 2 Severe Obesity (BMI >=35 and <=39.9). Obesity-related health conditions include the following: dyslipidemias. Obesity is unchanged. BMI is is above average; BMI management plan is completed. We discussed portion control and increasing exercise.      " Virtual  Physical Exam  Constitutional:       General: He is not in acute distress.     Appearance: Normal appearance.   Pulmonary:      Effort: Pulmonary effort is normal. No respiratory distress.   Neurological:      Mental Status: He is alert and oriented to person, place, and time.   Psychiatric:         Mood and Affect: Mood normal.          Result Review :     The following data was reviewed by: ROSIBEL Bee on 07/19/2022:  Covid 19 positive on 7/18/2022  Symptom onset was 7/16/2022         Assessment and Plan    Diagnoses and all orders for this visit:    1. COVID-19 (Primary)  Comments:  Covid 19 positive on 7/18/2022  Symptom onset was 7/16/2022    2. Cough  -     doxycycline (ADOXA) 100 MG tablet; Take 1 tablet by mouth 2 (Two) Times a Day for 10 days.  Dispense: 20 tablet; Refill: 0  -     benzonatate (TESSALON) 200 MG capsule; Take 1 capsule by mouth 3 (Three) Times a Day As Needed for Cough.  Dispense: 30 capsule; Refill: 0      Plan:  -Take all medications as prescribed and until completed.-  Hold antibiotic for worsening cough to prevent secondary infection  -Covid test was positive on 07/18/2022  -Monitor for fever and take Tylenol as needed.  Drink plenty of fluids and get plenty of rest.  -Use cool-mist humidifier as needed.  -Seek immediate medical attention for fever unrelieved by Tylenol, chest pain, shortness of breath, sharp back pain, or any other worsening signs or symptoms.  -Remain in quarantine until 10 days from symptom onset.  -The patient verbalized understanding of all instructions given today.     I would recommend an over-the-counter vitamin regimen to boost your immune system of the following: Vitamin D3 5,000 IU daily, vitamin C 500-1,000 mg twice daily, Quercetin 250 mg twice daily, Zinc 100 mg/day, and Melatonin 5-10 mg before bedtime.  You can purchase a pulse oximeter at any local pharmacy to monitor your oxygen saturations.  Call 911 if you have shortness of breath,  sharp chest pain, sharp pain in your back, or a fever that will not come down by Tylenol      This was an audio and video enabled telemedicine encounter.  Video visit lasted approx 13 minutes  Follow Up   Return if symptoms worsen or fail to improve.  Patient was given instructions and counseling regarding his condition or for health maintenance advice. Please see specific information pulled into the AVS if appropriate.

## 2022-08-01 ENCOUNTER — OFFICE VISIT (OUTPATIENT)
Dept: FAMILY MEDICINE CLINIC | Facility: CLINIC | Age: 57
End: 2022-08-01

## 2022-08-01 VITALS
TEMPERATURE: 97.5 F | HEIGHT: 74 IN | SYSTOLIC BLOOD PRESSURE: 118 MMHG | OXYGEN SATURATION: 99 % | RESPIRATION RATE: 16 BRPM | WEIGHT: 281 LBS | DIASTOLIC BLOOD PRESSURE: 70 MMHG | HEART RATE: 78 BPM | BODY MASS INDEX: 36.06 KG/M2

## 2022-08-01 DIAGNOSIS — E53.8 LOW SERUM VITAMIN B12: ICD-10-CM

## 2022-08-01 DIAGNOSIS — E53.8 LOW FOLIC ACID: ICD-10-CM

## 2022-08-01 DIAGNOSIS — F33.42 RECURRENT MAJOR DEPRESSIVE DISORDER, IN FULL REMISSION: ICD-10-CM

## 2022-08-01 DIAGNOSIS — E55.9 VITAMIN D DEFICIENCY DISEASE: ICD-10-CM

## 2022-08-01 DIAGNOSIS — E78.2 MIXED HYPERLIPIDEMIA: ICD-10-CM

## 2022-08-01 DIAGNOSIS — R06.83 SNORING: ICD-10-CM

## 2022-08-01 DIAGNOSIS — E03.9 ACQUIRED HYPOTHYROIDISM: Primary | ICD-10-CM

## 2022-08-01 DIAGNOSIS — R73.01 IMPAIRED FASTING GLUCOSE: ICD-10-CM

## 2022-08-01 DIAGNOSIS — E66.01 MORBIDLY OBESE: ICD-10-CM

## 2022-08-01 PROCEDURE — 99214 OFFICE O/P EST MOD 30 MIN: CPT | Performed by: PHYSICIAN ASSISTANT

## 2022-08-01 RX ORDER — FOLIC ACID 1 MG/1
1 TABLET ORAL DAILY
Qty: 90 TABLET | Refills: 3 | Status: SHIPPED | OUTPATIENT
Start: 2022-08-01

## 2022-08-01 RX ORDER — DESVENLAFAXINE 100 MG/1
100 TABLET, EXTENDED RELEASE ORAL DAILY
Qty: 30 TABLET | Refills: 11 | Status: SHIPPED | OUTPATIENT
Start: 2022-08-01

## 2022-08-01 RX ORDER — LEVOTHYROXINE SODIUM 88 UG/1
88 TABLET ORAL DAILY
Qty: 90 TABLET | Refills: 3 | Status: SHIPPED | OUTPATIENT
Start: 2022-08-01

## 2022-08-01 RX ORDER — EZETIMIBE 10 MG/1
10 TABLET ORAL DAILY
Qty: 90 TABLET | Refills: 3 | Status: SHIPPED | OUTPATIENT
Start: 2022-08-01

## 2022-08-01 RX ORDER — MAGNESIUM 200 MG
TABLET ORAL
Qty: 90 EACH | Refills: 3 | Status: SHIPPED | OUTPATIENT
Start: 2022-08-01 | End: 2022-08-01

## 2022-08-01 RX ORDER — MAGNESIUM 200 MG
TABLET ORAL
Qty: 90 EACH | Refills: 3 | Status: SHIPPED | OUTPATIENT
Start: 2022-08-01

## 2022-08-01 RX ORDER — LEVOTHYROXINE SODIUM 0.1 MG/1
TABLET ORAL
Qty: 90 TABLET | Refills: 0 | Status: SHIPPED | OUTPATIENT
Start: 2022-08-01 | End: 2022-08-01 | Stop reason: SDUPTHER

## 2022-08-01 RX ORDER — SILDENAFIL CITRATE 20 MG/1
TABLET ORAL
Qty: 30 TABLET | Refills: 5 | Status: SHIPPED | OUTPATIENT
Start: 2022-08-01

## 2022-08-01 RX ORDER — LEVOTHYROXINE SODIUM 0.1 MG/1
TABLET ORAL
Qty: 90 TABLET | Refills: 0 | Status: SHIPPED | OUTPATIENT
Start: 2022-08-01 | End: 2022-08-01

## 2022-08-01 NOTE — PROGRESS NOTES
"Subjective   Reynaldo Madden is a 56 y.o. male.     History of Present Illness    Since the last visit, he has overall felt tired.  He has Impaired fasting glucose and will monitor labs to watch for DMII, Hyperlipidemia on Zetia and is effective with lowering lipid values, Hypothyroidism with review of labs today and adjustment made in medication doseage with follow up labs ordered and Vitamin D deficiency and will update labs for continued management.  he has been compliant with current medications have reviewed them. But has not been taking B12 d, and folate regularly. Will restart. Free T4 was high----lower Rx dose Levothyroxine.   The patient denies medication side effects.  Will refill medications. /65 (BP Location: Left arm, Patient Position: Sitting, Cuff Size: Adult)   Pulse 78   Temp 97.5 °F (36.4 °C)   Resp 16   Ht 188 cm (74.02\")   Wt 127 kg (281 lb)   SpO2 99%   BMI 36.06 kg/m²   Levothyroxine 75 mcg was too low and the 100 mcg is too high will change to 88 mcg with follow-up labs plan.  ED Rx PRN works  Results for orders placed or performed in visit on 06/15/22   Comprehensive metabolic panel    Specimen: Blood   Result Value Ref Range    Glucose 88 65 - 99 mg/dL    BUN 13 6 - 24 mg/dL    Creatinine 1.25 0.76 - 1.27 mg/dL    EGFR Result 68 >59 mL/min/1.73    BUN/Creatinine Ratio 10 9 - 20    Sodium 141 134 - 144 mmol/L    Potassium 4.7 3.5 - 5.2 mmol/L    Chloride 106 96 - 106 mmol/L    Total CO2 22 20 - 29 mmol/L    Calcium 9.4 8.7 - 10.2 mg/dL    Total Protein 6.8 6.0 - 8.5 g/dL    Albumin 4.1 3.8 - 4.9 g/dL    Globulin 2.7 1.5 - 4.5 g/dL    A/G Ratio 1.5 1.2 - 2.2    Total Bilirubin 0.5 0.0 - 1.2 mg/dL    Alkaline Phosphatase 94 44 - 121 IU/L    AST (SGOT) 21 0 - 40 IU/L    ALT (SGPT) 29 0 - 44 IU/L   Lipid panel    Specimen: Blood   Result Value Ref Range    Total Cholesterol 208 (H) 100 - 199 mg/dL    Triglycerides 99 0 - 149 mg/dL    HDL Cholesterol 35 (L) >39 mg/dL    VLDL Cholesterol " William 18 5 - 40 mg/dL    LDL Chol Calc ([facility]) 155 (H) 0 - 99 mg/dL   T3, Free    Specimen: Blood   Result Value Ref Range    T3, Free 2.8 2.0 - 4.4 pg/mL   T4, Free    Specimen: Blood   Result Value Ref Range    Free T4 1.81 (H) 0.82 - 1.77 ng/dL   TSH    Specimen: Blood   Result Value Ref Range    TSH 2.390 0.450 - 4.500 uIU/mL   Vitamin B12    Specimen: Blood   Result Value Ref Range    Vitamin B-12 372 232 - 1,245 pg/mL   Folate    Specimen: Blood   Result Value Ref Range    Folate 5.4 >3.0 ng/mL   Vitamin D 25 Hydroxy    Specimen: Blood   Result Value Ref Range    25 Hydroxy, Vitamin D 15.7 (L) 30.0 - 100.0 ng/mL     Changed  Lexapro ----d/t sexual side effects to Pristiq      Snoring and need sleep study    Did see cardio------DR Kumari 7-13-21---neg stress echo---had work up abn EKG--neg  Statins raise his LFTs; on Zetia  He did see nephrology and I do want a note that I am expected that his creatinine will run normally around 1.4-1.5.  The rest of his work-up was negative.  Weight down 6 lbs  Had recent COVID----some pnd;  Cough;  No wheezing or SOA----2 weeks ago    Statins raise his LFTs; on Zetia  The following portions of the patient's history were reviewed and updated as appropriate: allergies, current medications, past family history, past medical history, past social history, past surgical history and problem list.    Review of Systems   Constitutional: Negative for activity change, appetite change and unexpected weight change.   HENT: Negative for nosebleeds and trouble swallowing.    Eyes: Negative for pain and visual disturbance.   Respiratory: Negative for chest tightness, shortness of breath and wheezing.    Cardiovascular: Negative for chest pain and palpitations.   Gastrointestinal: Negative for abdominal pain and blood in stool.   Endocrine: Negative.    Genitourinary: Negative for difficulty urinating and hematuria.   Musculoskeletal: Negative for joint swelling.   Skin: Negative for color  change and rash.   Allergic/Immunologic: Negative.    Neurological: Negative for syncope and speech difficulty.   Hematological: Negative for adenopathy.   Psychiatric/Behavioral: Negative for agitation, confusion, decreased concentration, dysphoric mood and sleep disturbance.   All other systems reviewed and are negative.      Objective   Physical Exam  Vitals and nursing note reviewed.   Constitutional:       General: He is not in acute distress.     Appearance: He is well-developed. He is obese. He is not diaphoretic.   HENT:      Head: Normocephalic.      Right Ear: External ear normal.      Left Ear: External ear normal.   Eyes:      General: No scleral icterus.     Conjunctiva/sclera: Conjunctivae normal.      Pupils: Pupils are equal, round, and reactive to light.   Neck:      Vascular: No carotid bruit.   Cardiovascular:      Rate and Rhythm: Normal rate and regular rhythm.      Pulses: Normal pulses.      Heart sounds: Normal heart sounds. No murmur heard.  Pulmonary:      Effort: Pulmonary effort is normal.      Breath sounds: Normal breath sounds.   Musculoskeletal:         General: Normal range of motion.      Cervical back: Normal range of motion and neck supple.      Right lower leg: No edema.      Left lower leg: No edema.   Skin:     General: Skin is warm and dry.      Findings: No rash.   Neurological:      Mental Status: He is alert and oriented to person, place, and time.   Psychiatric:         Mood and Affect: Mood normal. Affect is not inappropriate.         Behavior: Behavior normal.         Thought Content: Thought content normal.         Judgment: Judgment normal.         Assessment & Plan   Diagnoses and all orders for this visit:    1. Acquired hypothyroidism (Primary)    2. Low serum vitamin B12    3. Low folic acid    4. Vitamin D deficiency disease    5. Recurrent major depressive disorder, in full remission (HCC)    6. Morbidly obese (HCC)    7. Snoring  -     Ambulatory Referral to  Sleep Medicine    Other orders  -     Cyanocobalamin (Vitamin B-12) 1000 MCG sublingual tablet; One SL daily  Dispense: 90 each; Refill: 3  -     vitamin D3 (vitamin d) 125 MCG (5000 UT) capsule capsule; One PO daily  Dispense: 90 capsule; Refill: 3  -     folic acid (FOLVITE) 1 MG tablet; Take 1 tablet by mouth Daily.  Dispense: 90 tablet; Refill: 3        Do take oral B12, D and folic acid daily  Plan, Reynaldo Madden, was seen today.  he was seen for Imparied fasting glucose and plan follow up labs, diet, and exercise, Hyperlipidemia and will continue current medication, Hypothyroidism with abnormal labs and new medication regimen and Vitamin D deficiency and will update labs .  Get weight down. Exercise  See DR Justice woods sleep apnea  ED Rx generic Viagra works ---on file  Labs end Sept--thyroid also then---lower dose to 88mcg

## 2022-08-02 ENCOUNTER — PRIOR AUTHORIZATION (OUTPATIENT)
Dept: FAMILY MEDICINE CLINIC | Facility: CLINIC | Age: 57
End: 2022-08-02

## 2022-08-02 NOTE — TELEPHONE ENCOUNTER
PA started for Ezetimibe    PA Case: [id number], Status: Approved, Coverage Starts on: 8/2/2022 12:00:00 AM, Coverage Ends on: 8/2/2023 12:00:00 AM.    [place]

## 2022-10-12 ENCOUNTER — APPOINTMENT (OUTPATIENT)
Dept: SLEEP MEDICINE | Facility: HOSPITAL | Age: 57
End: 2022-10-12

## 2022-11-02 ENCOUNTER — OFFICE VISIT (OUTPATIENT)
Dept: SLEEP MEDICINE | Facility: HOSPITAL | Age: 57
End: 2022-11-02

## 2022-11-02 VITALS — HEART RATE: 85 BPM | HEIGHT: 74 IN | OXYGEN SATURATION: 95 % | WEIGHT: 284 LBS | BODY MASS INDEX: 36.45 KG/M2

## 2022-11-02 DIAGNOSIS — G47.8 NON-RESTORATIVE SLEEP: ICD-10-CM

## 2022-11-02 DIAGNOSIS — G47.10 HYPERSOMNIA: Primary | ICD-10-CM

## 2022-11-02 DIAGNOSIS — R06.83 SNORING: ICD-10-CM

## 2022-11-02 DIAGNOSIS — E66.9 OBESITY (BMI 30-39.9): ICD-10-CM

## 2022-11-02 PROCEDURE — G0463 HOSPITAL OUTPT CLINIC VISIT: HCPCS

## 2022-11-02 PROCEDURE — 99214 OFFICE O/P EST MOD 30 MIN: CPT | Performed by: FAMILY MEDICINE

## 2022-11-02 NOTE — PROGRESS NOTES
[facility] New Patient[facility]Consultation       Reason for Consultation: Snoring      Patient Care Team:  Kathleen Benjamin PA-C as PCP - General (Family Medicine)  Sabino Ladd MD as Consulting Physician (Gastroenterology)  Melodie Anguiano MD as Consulting Physician (Nephrology)  Kylie Coello MD as Consulting Physician (Orthopedic Surgery)  Viet Kumari Jr., MD as Consulting Physician (Cardiology)  Bonifacio Rendon MD as Consulting Physician (Sleep Medicine)      History of present illness:  Thank you for asking me to see your patient.  The patient is a 57 y.o. male with hyperlipidemia hypothyroidism depression ED presents today with concern for sleep disorder.  No history of prior sleep study or tonsillectomy.  Patient reports hypersomnia nonrestorative sleep began 20 pounds over the past 5 years snoring witnessed apneas leg jerking at night Urge sensation sleep-related bruxism nocturia 1-2 times a night restless sleep or sleepy when he increases sleep time nocturnal enuresis 2-3 times in the past 10 years.  Obese BMI 36.1.    Bedtime 9:30 PM to 10 PM to 20 1050 minutes with time 7:53 AM 6/9 hours urine absent rotating shifts.    Social History:  no tobacco use 5 alcoholic drinks per year 50 to 60 ounces of caffeine a day no drug use    Allergies:  Anesthetics, amide    Family History: JOSE ANGEL yes       Current Outpatient Medications:   •  benzonatate (TESSALON) 200 MG capsule, Take 1 capsule by mouth 3 (Three) Times a Day As Needed for Cough., Disp: 30 capsule, Rfl: 0  •  Cyanocobalamin (Vitamin B-12) 1000 MCG sublingual tablet, One SL daily, Disp: 90 each, Rfl: 3  •  desvenlafaxine (PRISTIQ) 100 MG 24 hr tablet, Take 1 tablet by mouth Daily. For depression, Disp: 30 tablet, Rfl: 11  •  ezetimibe (ZETIA) 10 MG tablet, Take 1 tablet by mouth Daily. for cholesterol, Disp: 90 tablet, Rfl: 3  •  folic acid (FOLVITE) 1 MG tablet, Take 1 tablet by mouth Daily., Disp: 90 tablet, Rfl: 3  •   "levothyroxine (Synthroid) 88 MCG tablet, Take 1 tablet by mouth Daily. For thyroid before breakfast, Disp: 90 tablet, Rfl: 3  •  sildenafil (REVATIO) 20 MG tablet, 2-5 tabs PO once daily PRN ED, Disp: 30 tablet, Rfl: 5  •  vitamin D3 (vitamin d) 125 MCG (5000 UT) capsule capsule, One PO daily, Disp: 90 capsule, Rfl: 3    Vital Signs:    Vitals:    11/02/22 1126   Pulse: 85   SpO2: 95%   Weight: 129 kg (284 lb)   Height: 188 cm (74.02\")      Body mass index is 36.44 kg/m².  Neck Circumference: 19 inches      REVIEW OF SYSTEMS.  Full review of systems available on the intake form which is scanned in the media tab.  The relevant positive are noted below  1. Daytime excessive sleepiness with [place] Sleepiness Scale :Total score: 9   2. Snoring  3. Nasal congestion anxiety depression      Physical exam:  Vitals:    11/02/22 1126   Pulse: 85   SpO2: 95%   Weight: 129 kg (284 lb)   Height: 188 cm (74.02\")    Body mass index is 36.44 kg/m². Neck Circumference: 19 inches  HEENT: Head is atraumatic, normocephalic  Eyes: pupils are round equal and reacting to light and accommodation, conjunctiva normal  RESPIRATORY SYSTEM: Regular respirations  CARDIOVASULAR SYSTEM: Regular rate  EXTREMITES: No cyanosis, clubbing  NEUROLOGICAL SYSTEM: Oriented x 3, no gross motor defects, gait normal      Impression:  1. Hypersomnia    2. Non-restorative sleep    3. Snoring    4. Obesity (BMI 30-39.9)        Plan:    Good sleep hygiene measures should be maintained.  Weight loss would be beneficial in this patient who is obese BMI 36.1.    I discussed the pathophysiology of obstructive sleep apnea with the patient.  We discussed the adverse outcomes associated with untreated sleep-disordered breathing.  We discussed treatment modalities of obstructive sleep apnea including CPAP device as well as oral mandibular advancement device. Sleep study will be scheduled to establish definitive diagnosis of sleep disorder breathing.  Weight loss will be " strongly beneficial in order to reduce the severity of sleep-disordered breathing.  Patient has narrow oropharyngeal structure.  Caution during activities that require prolonged concentration is strongly advised.  Patient will be notified of sleep study results after sleep study is completed.  If sleep apnea is only mild,  oral mandibular advancement device may be one of the treatment options.  However if sleep apnea is moderately severe, CPAP treatment will be strongly encouraged.  The patient is not opposed to treatment with CPAP device if we confirm significant obstructive sleep apnea on polysomnography.    Is mild interested in dental referral.    Thank you for allowing me to participate in your patient's care.    Bonifacio Rendon MD  Sleep Medicine  11/02/22  11:35 EDT

## 2022-12-06 ENCOUNTER — APPOINTMENT (OUTPATIENT)
Dept: SLEEP MEDICINE | Facility: HOSPITAL | Age: 57
End: 2022-12-06

## 2023-01-17 ENCOUNTER — HOSPITAL ENCOUNTER (OUTPATIENT)
Dept: SLEEP MEDICINE | Facility: HOSPITAL | Age: 58
Discharge: HOME | End: 2023-01-17
Admitting: FAMILY MEDICINE
Payer: COMMERCIAL

## 2023-01-17 DIAGNOSIS — G47.8 NON-RESTORATIVE SLEEP: ICD-10-CM

## 2023-01-17 DIAGNOSIS — G47.10 HYPERSOMNIA: ICD-10-CM

## 2023-01-17 DIAGNOSIS — E66.9 OBESITY (BMI 30-39.9): ICD-10-CM

## 2023-01-17 DIAGNOSIS — R06.83 SNORING: ICD-10-CM

## 2023-01-17 PROCEDURE — 95806 SLEEP STUDY UNATT&RESP EFFT: CPT

## 2023-01-17 PROCEDURE — 95806 SLEEP STUDY UNATT&RESP EFFT: CPT | Performed by: FAMILY MEDICINE

## 2023-01-22 RX ORDER — LEVOTHYROXINE SODIUM 0.1 MG/1
TABLET ORAL
Qty: 90 TABLET | Refills: 0 | OUTPATIENT
Start: 2023-01-22

## 2023-02-01 DIAGNOSIS — G47.33 OBSTRUCTIVE SLEEP APNEA: Primary | ICD-10-CM

## 2023-02-01 DIAGNOSIS — R06.83 SNORING: ICD-10-CM

## 2023-02-01 DIAGNOSIS — G47.10 HYPERSOMNIA: ICD-10-CM

## 2023-02-01 DIAGNOSIS — E66.9 OBESITY (BMI 30-39.9): ICD-10-CM

## 2023-02-01 DIAGNOSIS — G47.8 NON-RESTORATIVE SLEEP: ICD-10-CM

## 2023-02-02 ENCOUNTER — TELEPHONE (OUTPATIENT)
Dept: SLEEP MEDICINE | Facility: HOSPITAL | Age: 58
End: 2023-02-02
Payer: COMMERCIAL

## 2023-02-02 NOTE — TELEPHONE ENCOUNTER
Spoke with pt about results and faxed order to [facility] for set up. Pt will cb and schedule a compliance f/u appt.

## 2023-04-06 ENCOUNTER — OFFICE VISIT (OUTPATIENT)
Dept: SLEEP MEDICINE | Facility: HOSPITAL | Age: 58
End: 2023-04-06
Payer: COMMERCIAL

## 2023-04-06 VITALS
WEIGHT: 284 LBS | HEART RATE: 62 BPM | OXYGEN SATURATION: 95 % | HEIGHT: 74 IN | SYSTOLIC BLOOD PRESSURE: 150 MMHG | BODY MASS INDEX: 36.45 KG/M2 | DIASTOLIC BLOOD PRESSURE: 82 MMHG

## 2023-04-06 DIAGNOSIS — Z99.89 OSA ON CPAP: Primary | ICD-10-CM

## 2023-04-06 DIAGNOSIS — E66.9 CLASS 2 OBESITY: ICD-10-CM

## 2023-04-06 DIAGNOSIS — G47.33 OSA ON CPAP: Primary | ICD-10-CM

## 2023-04-06 PROBLEM — E66.812 CLASS 2 OBESITY: Status: ACTIVE | Noted: 2023-04-06

## 2023-04-06 PROCEDURE — G0463 HOSPITAL OUTPT CLINIC VISIT: HCPCS

## 2023-04-06 PROCEDURE — 99213 OFFICE O/P EST LOW 20 MIN: CPT | Performed by: INTERNAL MEDICINE

## 2023-04-06 NOTE — PROGRESS NOTES
"  [address] [address]  [place]   [place] [address]  Phone: [phone]  Fax: [phone]      SLEEP CLINIC FOLLOW UP PROGRESS NOTE.    Reynaldo Madden  [id number]   1965  57 y.o.  male      PCP: Kathleen Benjamin PA-C      Date of visit: 4/6/2023    Chief Complaint   Patient presents with   • Sleep Apnea   • Obesity       HPI:  This is a 57 y.o. years old patient is here for the management of obstructive sleep apnea.  Sleep apnea is moderate in severity with a AHI of 21/hr. Patient is using positive airway pressure therapy with auto CPAP and the symptoms of sleep apnea have improved significantly on the therapy. Normally patient goes to bed at 10 PM and wakes up at 7 AM .  The patient wakes up 1-2 time(s) during the night and has no problem going back to sleep.  Feels refreshed after waking up.     Medications and allergies are reviewed by me and documented in the encounter.     SOCIAL (habits pertaining to sleep medicine)  • History tobacco use:No   • History of alcohol use: 0 per week  • Caffeine use: 3     REVIEW OF SYSTEMS:   Pertaining positive symptoms are:  • [place] Sleepiness Scale :Total score: 5   • Anxiety      PHYSICAL EXAMINATION:  CONSTITUTIONAL:  Vitals:    04/06/23 0900   BP: 150/82   Pulse: 62   SpO2: 95%   Weight: 129 kg (284 lb)   Height: 188 cm (74.02\")    Body mass index is 36.45 kg/m².   NOSE: nasal passages are clear, No deformities noted   RESP SYSTEM: Not in any respiratory distress, no chest deformities noted,   CARDIOVASULAR: No edema noted  NEURO: Oriented x 3, gait normal,  Mood and affect appeared appropriate      Data reviewed:  The Smart card downloaded on 4/6/2023 has been reviewed independently by me for compliance and discussed the data with the patient.   Compliance; 97%  More than 4 hr use, 85%  Average use of the device 6 hours and 18 per night  Residual AHI: 0.4 /hr (goal < 5.0 /hr)  Mask type: Fullface  Device: ResMed  DME: [employer] [facility]      ASSESSMENT AND PLAN:  · Obstructive sleep apnea ( G 47.33).  The symptoms of sleep apnea have improved with the device and the treatment.  Patient's compliance with the device is excellent for treatment of sleep apnea.  I have independently reviewed the smart card down load and discussed with the patient the download data and encouarged the patient to continue to use the device.The residual AHI is acceptable. The device is benefiting the patient and the device is medically necessary.  Without proper control of sleep apnea and good compliance there is a increased risk for hypertension, diabetes mellitus and nonrestorative sleep with hypersomnia which can increase risk for motor vehicle accidents.  Untreated sleep apnea is also a risk factor for development of atrial fibrillation, pulmonary hypertension, insulin resistance and stroke. The patient is also instructed to get the supplies from the [employer] company and and change them on a regular basis.  A prescription for supplies has been sent to the [employer] company.  I have also discussed the good sleep hygiene habits and adequate amount of sleep needed for good health.  · Obesity  2 with BMI is Body mass index is 36.45 kg/m².. I have discuss the relationship between the weight and sleep apnea. The benefit of weight loss in reducing severity of sleep apnea was discussed. Discussed diet and exercise with the patient to achieve ideal BMI.   · Return in about 1 year (around 4/6/2024) for with smart card down load dr vaz. . Patient's questions were answered.    4/6/2023  Scott Montero MD  Sleep Medicine.  Medical Director,   [facility] sleep centers.

## 2023-05-04 RX ORDER — LEVOTHYROXINE SODIUM 0.1 MG/1
TABLET ORAL
Qty: 90 TABLET | Refills: 0 | OUTPATIENT
Start: 2023-05-04

## 2023-09-11 RX ORDER — EZETIMIBE 10 MG/1
10 TABLET ORAL DAILY
Qty: 90 TABLET | Refills: 3 | OUTPATIENT
Start: 2023-09-11

## 2023-09-11 RX ORDER — MAGNESIUM 200 MG
TABLET ORAL
Qty: 90 TABLET | OUTPATIENT
Start: 2023-09-11

## 2023-09-11 RX ORDER — FOLIC ACID 1 MG/1
1 TABLET ORAL DAILY
Qty: 90 TABLET | Refills: 3 | OUTPATIENT
Start: 2023-09-11

## 2023-09-11 RX ORDER — DESVENLAFAXINE 100 MG/1
100 TABLET, EXTENDED RELEASE ORAL DAILY
Qty: 30 TABLET | Refills: 11 | OUTPATIENT
Start: 2023-09-11

## 2023-09-15 RX ORDER — EZETIMIBE 10 MG/1
10 TABLET ORAL DAILY
Qty: 90 TABLET | Refills: 3 | Status: SHIPPED | OUTPATIENT
Start: 2023-09-15

## 2023-09-15 RX ORDER — FOLIC ACID 1 MG/1
1 TABLET ORAL DAILY
Qty: 90 TABLET | Refills: 3 | Status: SHIPPED | OUTPATIENT
Start: 2023-09-15

## 2023-09-15 RX ORDER — DESVENLAFAXINE 100 MG/1
100 TABLET, EXTENDED RELEASE ORAL DAILY
Qty: 30 TABLET | Refills: 11 | Status: SHIPPED | OUTPATIENT
Start: 2023-09-15

## 2023-10-09 ENCOUNTER — OFFICE VISIT (OUTPATIENT)
Dept: FAMILY MEDICINE CLINIC | Facility: CLINIC | Age: 58
End: 2023-10-09
Payer: COMMERCIAL

## 2023-10-09 VITALS
TEMPERATURE: 96.6 F | HEIGHT: 74 IN | BODY MASS INDEX: 37.6 KG/M2 | HEART RATE: 85 BPM | OXYGEN SATURATION: 96 % | DIASTOLIC BLOOD PRESSURE: 84 MMHG | SYSTOLIC BLOOD PRESSURE: 144 MMHG | WEIGHT: 293 LBS | RESPIRATION RATE: 16 BRPM

## 2023-10-09 DIAGNOSIS — R73.01 IMPAIRED FASTING GLUCOSE: ICD-10-CM

## 2023-10-09 DIAGNOSIS — F33.42 RECURRENT MAJOR DEPRESSIVE DISORDER, IN FULL REMISSION: ICD-10-CM

## 2023-10-09 DIAGNOSIS — N52.9 ERECTILE DYSFUNCTION, UNSPECIFIED ERECTILE DYSFUNCTION TYPE: ICD-10-CM

## 2023-10-09 DIAGNOSIS — R03.0 BORDERLINE HYPERTENSION: Primary | Chronic | ICD-10-CM

## 2023-10-09 DIAGNOSIS — G47.33 OSA ON CPAP: ICD-10-CM

## 2023-10-09 DIAGNOSIS — E03.9 ACQUIRED HYPOTHYROIDISM: ICD-10-CM

## 2023-10-09 DIAGNOSIS — E78.2 MIXED HYPERLIPIDEMIA: ICD-10-CM

## 2023-10-09 DIAGNOSIS — E53.8 LOW SERUM VITAMIN B12: ICD-10-CM

## 2023-10-09 DIAGNOSIS — Z23 FLU VACCINE NEED: ICD-10-CM

## 2023-10-09 DIAGNOSIS — Z12.5 SCREENING PSA (PROSTATE SPECIFIC ANTIGEN): ICD-10-CM

## 2023-10-09 DIAGNOSIS — E55.9 VITAMIN D DEFICIENCY DISEASE: ICD-10-CM

## 2023-10-09 DIAGNOSIS — E53.8 LOW FOLIC ACID: ICD-10-CM

## 2023-10-09 RX ORDER — EZETIMIBE 10 MG/1
10 TABLET ORAL DAILY
Qty: 90 TABLET | Refills: 3 | Status: SHIPPED | OUTPATIENT
Start: 2023-10-09

## 2023-10-09 RX ORDER — LEVOTHYROXINE SODIUM 88 UG/1
88 TABLET ORAL DAILY
Qty: 90 TABLET | Refills: 3 | Status: SHIPPED | OUTPATIENT
Start: 2023-10-09 | End: 2023-10-10 | Stop reason: DRUGHIGH

## 2023-10-09 RX ORDER — FOLIC ACID 1 MG/1
1 TABLET ORAL DAILY
Qty: 90 TABLET | Refills: 3 | Status: SHIPPED | OUTPATIENT
Start: 2023-10-09

## 2023-10-09 RX ORDER — SILDENAFIL CITRATE 20 MG/1
TABLET ORAL
Qty: 30 TABLET | Refills: 1 | Status: SHIPPED | OUTPATIENT
Start: 2023-10-09

## 2023-10-09 RX ORDER — DESVENLAFAXINE 100 MG/1
100 TABLET, EXTENDED RELEASE ORAL DAILY
Qty: 90 TABLET | Refills: 1 | Status: SHIPPED | OUTPATIENT
Start: 2023-10-09

## 2023-10-09 NOTE — PROGRESS NOTES
"Subjective   Reynaldo Madden is a 57 y.o. male.     History of Present Illness    Since the last visit, he has overall felt fairly well.  He has Borderline Hypertension and will need to monitor bp with close follow up, Hyperlipidemia on Zetia and is effective with lowering lipid values, Hypothyroidism and must update labs to continue treatment, and Vitamin D deficiency and will update labs for continued management.  he has been compliant with current medications have reviewed them.  The patient denies medication side effects.  Will refill medications. /78   Pulse 85   Temp 96.6 øF (35.9 øC) (Temporal)   Resp 16   Ht 188 cm (74.02\")   Wt 133 kg (293 lb)   SpO2 96%   BMI 37.60 kg/mý   Use Cpap/Bipap every night    Results for orders placed or performed in visit on 09/29/22   T4, Free    Specimen: Blood   Result Value Ref Range    Free T4 1.54 0.93 - 1.70 ng/dL   T3, Free    Specimen: Blood   Result Value Ref Range    T3, Free 3.2 2.0 - 4.4 pg/mL   TSH    Specimen: Blood   Result Value Ref Range    TSH 1.760 0.270 - 4.200 uIU/mL   Vitamin D 25 Hydroxy    Specimen: Blood   Result Value Ref Range    25 Hydroxy, Vitamin D 50.2 30.0 - 100.0 ng/ml   Vitamin B12    Specimen: Blood   Result Value Ref Range    Vitamin B-12 953 (H) 211 - 946 pg/mL   Folate    Specimen: Blood   Result Value Ref Range    Folate >20.00 4.78 - 24.20 ng/mL   Hemoglobin A1c    Specimen: Blood   Result Value Ref Range    Hemoglobin A1C 5.80 (H) 4.80 - 5.60 %   Comprehensive Metabolic Panel    Specimen: Blood   Result Value Ref Range    Glucose 92 65 - 99 mg/dL    BUN 12 6 - 20 mg/dL    Creatinine 1.26 0.76 - 1.27 mg/dL    EGFR Result 66.9 >60.0 mL/min/1.73    BUN/Creatinine Ratio 9.5 7.0 - 25.0    Sodium 137 136 - 145 mmol/L    Potassium 4.1 3.5 - 5.2 mmol/L    Chloride 104 98 - 107 mmol/L    Total CO2 24.3 22.0 - 29.0 mmol/L    Calcium 9.0 8.6 - 10.5 mg/dL    Total Protein 6.7 6.0 - 8.5 g/dL    Albumin 4.20 3.50 - 5.20 g/dL    Globulin " 2.5 gm/dL    A/G Ratio 1.7 g/dL    Total Bilirubin 0.5 0.0 - 1.2 mg/dL    Alkaline Phosphatase 94 39 - 117 U/L    AST (SGOT) 20 1 - 40 U/L    ALT (SGPT) 25 1 - 41 U/L   CBC & Differential    Specimen: Blood   Result Value Ref Range    WBC 5.38 3.40 - 10.80 10*3/mm3    RBC 4.90 4.14 - 5.80 10*6/mm3    Hemoglobin 15.2 13.0 - 17.7 g/dL    Hematocrit 43.4 37.5 - 51.0 %    MCV 88.6 79.0 - 97.0 fL    MCH 31.0 26.6 - 33.0 pg    MCHC 35.0 31.5 - 35.7 g/dL    RDW 12.6 12.3 - 15.4 %    Platelets 273 140 - 450 10*3/mm3    Neutrophil Rel % 48.6 42.7 - 76.0 %    Lymphocyte Rel % 39.8 19.6 - 45.3 %    Monocyte Rel % 8.4 5.0 - 12.0 %    Eosinophil Rel % 2.4 0.3 - 6.2 %    Basophil Rel % 0.4 0.0 - 1.5 %    Neutrophils Absolute 2.62 1.70 - 7.00 10*3/mm3    Lymphocytes Absolute 2.14 0.70 - 3.10 10*3/mm3    Monocytes Absolute 0.45 0.10 - 0.90 10*3/mm3    Eosinophils Absolute 0.13 0.00 - 0.40 10*3/mm3    Basophils Absolute 0.02 0.00 - 0.20 10*3/mm3    Immature Granulocyte Rel % 0.4 0.0 - 0.5 %    Immature Grans Absolute 0.02 0.00 - 0.05 10*3/mm3    nRBC 0.0 0.0 - 0.2 /100 WBC   Last labs were 1 year ago 10/10/2022.  Noted last A1c was 5.8%  Patient had follow-up with sleep medicine for 623 obstructive sleep apnea with treatment of CPAP and working well.  To follow-up 1 year  Last visit with Dr. Pike, asthma allergy specialist was 3/11/2023 and was given Kenalog injection and continue Zyrtec over-the-counter.  Did see cardio------DR Kumari 7-13-21---neg stress echo---had work up abn EKG--neg   Statins raise his LFTs; on Zetia  He did see nephrology  3-1-21 and I do want a note that I am expected that his creatinine will run normally around 1.4-1.5.  The rest of his work-up was negative.  Had recent COVID----some pnd;  Cough;  No wheezing or SOA----2 weeks ago  The following portions of the patient's history were reviewed and updated as appropriate: allergies, current medications, past family history, past medical history, past social  history, past surgical history, and problem list.    Review of Systems   Constitutional:  Negative for diaphoresis.   HENT:  Negative for nosebleeds and trouble swallowing.    Eyes:  Negative for blurred vision and visual disturbance.   Respiratory:  Negative for choking.    Gastrointestinal:  Negative for blood in stool.   Allergic/Immunologic: Negative for immunocompromised state.   Neurological:  Negative for facial asymmetry and speech difficulty.   Psychiatric/Behavioral:  Negative for self-injury and suicidal ideas.        Objective   Physical Exam  Vitals and nursing note reviewed.   Constitutional:       General: He is not in acute distress.     Appearance: He is well-developed. He is obese. He is not diaphoretic.   HENT:      Head: Normocephalic.   Eyes:      Conjunctiva/sclera: Conjunctivae normal.      Pupils: Pupils are equal, round, and reactive to light.   Neck:      Vascular: No carotid bruit.   Cardiovascular:      Rate and Rhythm: Normal rate and regular rhythm.      Heart sounds: Normal heart sounds. No murmur heard.  Pulmonary:      Effort: Pulmonary effort is normal.      Breath sounds: Normal breath sounds.   Musculoskeletal:         General: Normal range of motion.      Cervical back: Normal range of motion and neck supple.      Right lower leg: No edema.      Left lower leg: No edema.   Skin:     General: Skin is warm and dry.      Findings: No rash.   Neurological:      Mental Status: He is alert and oriented to person, place, and time.   Psychiatric:         Mood and Affect: Mood normal. Affect is not inappropriate.         Behavior: Behavior normal.         Thought Content: Thought content normal.         Judgment: Judgment normal.           Assessment & Plan   Diagnoses and all orders for this visit:    1. Mixed hyperlipidemia (Primary)  -     ezetimibe (ZETIA) 10 MG tablet; Take 1 tablet by mouth Daily. for cholesterol  Dispense: 90 tablet; Refill: 3    2. Vitamin D deficiency disease  -      vitamin D3 (vitamin d) 125 MCG (5000 UT) capsule capsule; One PO daily  Dispense: 90 capsule; Refill: 3    3. Acquired hypothyroidism  -     levothyroxine (Synthroid) 88 MCG tablet; Take 1 tablet by mouth Daily. For thyroid before breakfast  Dispense: 90 tablet; Refill: 3    4. Erectile dysfunction, unspecified erectile dysfunction type  -     sildenafil (REVATIO) 20 MG tablet; 2-5 tabs PO once daily PRN ED  Dispense: 30 tablet; Refill: 1    5. Flu vaccine need  -     Fluzone >6 Months ([id number])    6. Impaired fasting glucose    7. JOSE ANGEL on CPAP    8. Recurrent major depressive disorder, in full remission    9. Low serum vitamin B12    10. Low folic acid    Other orders  -     desvenlafaxine (PRISTIQ) 100 MG 24 hr tablet; Take 1 tablet by mouth Daily. For depression  Dispense: 90 tablet; Refill: 1  -     folic acid (FOLVITE) 1 MG tablet; Take 1 tablet by mouth Daily.  Dispense: 90 tablet; Refill: 3      Plan, Reynaldo Madden, was seen today.  he was seen for Borderline HTN and continue to monitor bp readings, Imparied fasting glucose and plan follow up labs, diet, and exercise, Hyperlipidemia and will continue current medication, Hypothyroidism and will need to update labs for continued treatment, and Vitamin D deficiency and will update labs .  Continue Pristiq working well for anxiety and depression  Continue B12 folic acid vitamin D and will update labs  Continue Viagra as needed ED does help  Use Cpap/Bipap every night  Get weight down  Get copy of vaccines from [employer]  Remains on Zetia for treatment of cholesterol noting statins raises liver functions  We will monitor blood pressure as well he can message me if his blood pressure staying above 140/90 either number and will start medication... Okay to wait and see me back in 6 weeks and work on diet and exercise

## 2023-10-09 NOTE — PATIENT INSTRUCTIONS
Exercising to Lose Weight  Getting regular exercise is important for everyone. It is especially important if you are overweight. Being overweight increases your risk of heart disease, stroke, diabetes, high blood pressure, and several types of cancer. Exercising, and reducing the calories you consume, can help you lose weight and improve fitness and health.  Exercise can be moderate or vigorous intensity. To lose weight, most people need to do a certain amount of moderate or vigorous-intensity exercise each week.  How can exercise affect me?  You lose weight when you exercise enough to burn more calories than you eat. Exercise also reduces body fat and builds muscle. The more muscle you have, the more calories you burn. Exercise also:  Improves mood.  Reduces stress and tension.  Improves your overall fitness, flexibility, and endurance.  Increases bone strength.  Moderate-intensity exercise    Moderate-intensity exercise is any activity that gets you moving enough to burn at least three times more energy (calories) than if you were sitting.  Examples of moderate exercise include:  Walking a mile in 15 minutes.  Doing light yard work.  Biking at an easy pace.  Most people should get at least 150 minutes of moderate-intensity exercise a week to maintain their body weight.  Vigorous-intensity exercise  Vigorous-intensity exercise is any activity that gets you moving enough to burn at least six times more calories than if you were sitting. When you exercise at this intensity, you should be working hard enough that you are not able to carry on a conversation.  Examples of vigorous exercise include:  Running.  Playing a team sport, such as football, basketball, and soccer.  Jumping rope.  Most people should get at least 75 minutes a week of vigorous exercise to maintain their body weight.  What actions can I take to lose weight?  The amount of exercise you need to lose weight depends on:  Your age.  The type of  exercise.  Any health conditions you have.  Your overall physical ability.  Talk to your health care provider about how much exercise you need and what types of activities are safe for you.  Nutrition    Make changes to your diet as told by your health care provider or diet and nutrition specialist (dietitian). This may include:  Eating fewer calories.  Eating more protein.  Eating less unhealthy fats.  Eating a diet that includes fresh fruits and vegetables, whole grains, low-fat dairy products, and lean protein.  Avoiding foods with added fat, salt, and sugar.  Drink plenty of water while you exercise to prevent dehydration or heat stroke.  Activity  Choose an activity that you enjoy and set realistic goals. Your health care provider can help you make an exercise plan that works for you.  Exercise at a moderate or vigorous intensity most days of the week.  The intensity of exercise may vary from person to person. You can tell how intense a workout is for you by paying attention to your breathing and heartbeat. Most people will notice their breathing and heartbeat get faster with more intense exercise.  Do resistance training twice each week, such as:  Push-ups.  Sit-ups.  Lifting weights.  Using resistance bands.  Getting short amounts of exercise can be just as helpful as long, structured periods of exercise. If you have trouble finding time to exercise, try doing these things as part of your daily routine:  Get up, stretch, and walk around every 30 minutes throughout the day.  Go for a walk during your lunch break.  Park your car farther away from your destination.  If you take public transportation, get off one stop early and walk the rest of the way.  Make phone calls while standing up and walking around.  Take the stairs instead of elevators or escalators.  Wear comfortable clothes and shoes with good support.  Do not exercise so much that you hurt yourself, feel dizzy, or get very short of breath.  Where to  find more information  [place] Department of Health and Human Services: www.hhs.gov  Centers for Disease Control and Prevention: www.cdc.gov  Contact a health care provider:  Before starting a new exercise program.  If you have questions or concerns about your weight.  If you have a medical problem that keeps you from exercising.  Get help right away if:  You have any of the following while exercising:  Injury.  Dizziness.  Difficulty breathing or shortness of breath that does not go away when you stop exercising.  Chest pain.  Rapid heartbeat.  These symptoms may represent a serious problem that is an emergency. Do not wait to see if the symptoms will go away. Get medical help right away. Call your local emergency services (911 in the [place] Do not drive yourself to the hospital.  Summary  Getting regular exercise is especially important if you are overweight.  Being overweight increases your risk of heart disease, stroke, diabetes, high blood pressure, and several types of cancer.  Losing weight happens when you burn more calories than you eat.  Reducing the amount of calories you eat, and getting regular moderate or vigorous exercise each week, helps you lose weight.  This information is not intended to replace advice given to you by your health care provider. Make sure you discuss any questions you have with your health care provider.  Document Revised: 02/13/2022 Document Reviewed: 02/13/2022  [employer] Patient Education c 2023 [employer]

## 2023-10-09 NOTE — LETTER
October 9, 2023     Patient: Reynaldo Madden   YOB: 1965   Date of Visit: 10/9/2023       To Whom It May Concern:      Mr. Madden remains under my medical care and has for several years.  He has no contraindications or medical conditions that would prohibit him from serving in any capacity in his [employer].  No restrictions for training as a [employer].       Sincerely,        Kathleen Benjamin PA-C    CC: No Recipients

## 2023-10-10 LAB
25(OH)D3+25(OH)D2 SERPL-MCNC: 30.4 NG/ML (ref 30–100)
ALBUMIN SERPL-MCNC: 4.7 G/DL (ref 3.5–5.2)
ALBUMIN/GLOB SERPL: 1.7 G/DL
ALP SERPL-CCNC: 106 U/L (ref 39–117)
ALT SERPL-CCNC: 27 U/L (ref 1–41)
APPEARANCE UR: CLEAR
AST SERPL-CCNC: 24 U/L (ref 1–40)
BACTERIA #/AREA URNS HPF: NORMAL /HPF
BASOPHILS # BLD AUTO: 0.03 10*3/MM3 (ref 0–0.2)
BASOPHILS NFR BLD AUTO: 0.6 % (ref 0–1.5)
BILIRUB SERPL-MCNC: 0.7 MG/DL (ref 0–1.2)
BILIRUB UR QL STRIP: NEGATIVE
BUN SERPL-MCNC: 13 MG/DL (ref 6–20)
BUN/CREAT SERPL: 10.1 (ref 7–25)
CALCIUM SERPL-MCNC: 9.7 MG/DL (ref 8.6–10.5)
CASTS URNS MICRO: NORMAL
CHLORIDE SERPL-SCNC: 103 MMOL/L (ref 98–107)
CHOLEST SERPL-MCNC: 248 MG/DL (ref 0–200)
CO2 SERPL-SCNC: 24.7 MMOL/L (ref 22–29)
COLOR UR: YELLOW
CREAT SERPL-MCNC: 1.29 MG/DL (ref 0.76–1.27)
EGFRCR SERPLBLD CKD-EPI 2021: 64.7 ML/MIN/1.73
EOSINOPHIL # BLD AUTO: 0.17 10*3/MM3 (ref 0–0.4)
EOSINOPHIL NFR BLD AUTO: 3.3 % (ref 0.3–6.2)
EPI CELLS #/AREA URNS HPF: NORMAL /HPF
ERYTHROCYTE [DISTWIDTH] IN BLOOD BY AUTOMATED COUNT: 12.3 % (ref 12.3–15.4)
FOLATE SERPL-MCNC: >20 NG/ML (ref 4.78–24.2)
GLOBULIN SER CALC-MCNC: 2.7 GM/DL
GLUCOSE SERPL-MCNC: 96 MG/DL (ref 65–99)
GLUCOSE UR QL STRIP: NEGATIVE
HBA1C MFR BLD: 5.7 % (ref 4.8–5.6)
HCT VFR BLD AUTO: 45.5 % (ref 37.5–51)
HDLC SERPL-MCNC: 47 MG/DL (ref 40–60)
HGB BLD-MCNC: 15.9 G/DL (ref 13–17.7)
HGB UR QL STRIP: NEGATIVE
IMM GRANULOCYTES # BLD AUTO: 0.02 10*3/MM3 (ref 0–0.05)
IMM GRANULOCYTES NFR BLD AUTO: 0.4 % (ref 0–0.5)
KETONES UR QL STRIP: NEGATIVE
LDLC SERPL CALC-MCNC: 185 MG/DL (ref 0–100)
LEUKOCYTE ESTERASE UR QL STRIP: NEGATIVE
LYMPHOCYTES # BLD AUTO: 1.61 10*3/MM3 (ref 0.7–3.1)
LYMPHOCYTES NFR BLD AUTO: 31.3 % (ref 19.6–45.3)
MCH RBC QN AUTO: 31.1 PG (ref 26.6–33)
MCHC RBC AUTO-ENTMCNC: 34.9 G/DL (ref 31.5–35.7)
MCV RBC AUTO: 88.9 FL (ref 79–97)
MONOCYTES # BLD AUTO: 0.31 10*3/MM3 (ref 0.1–0.9)
MONOCYTES NFR BLD AUTO: 6 % (ref 5–12)
NEUTROPHILS # BLD AUTO: 3 10*3/MM3 (ref 1.7–7)
NEUTROPHILS NFR BLD AUTO: 58.4 % (ref 42.7–76)
NITRITE UR QL STRIP: NEGATIVE
NRBC BLD AUTO-RTO: 0 /100 WBC (ref 0–0.2)
PH UR STRIP: 7.5 [PH] (ref 5–8)
PLATELET # BLD AUTO: 257 10*3/MM3 (ref 140–450)
POTASSIUM SERPL-SCNC: 4 MMOL/L (ref 3.5–5.2)
PROT SERPL-MCNC: 7.4 G/DL (ref 6–8.5)
PROT UR QL STRIP: ABNORMAL
RBC # BLD AUTO: 5.12 10*6/MM3 (ref 4.14–5.8)
RBC #/AREA URNS HPF: NORMAL /HPF
SODIUM SERPL-SCNC: 139 MMOL/L (ref 136–145)
SP GR UR STRIP: 1.02 (ref 1–1.03)
T3FREE SERPL-MCNC: 3 PG/ML (ref 2–4.4)
T4 FREE SERPL-MCNC: 1 NG/DL (ref 0.93–1.7)
TRIGL SERPL-MCNC: 89 MG/DL (ref 0–150)
TSH SERPL DL<=0.005 MIU/L-ACNC: 5.37 UIU/ML (ref 0.27–4.2)
UROBILINOGEN UR STRIP-MCNC: ABNORMAL MG/DL
VIT B12 SERPL-MCNC: 420 PG/ML (ref 211–946)
VLDLC SERPL CALC-MCNC: 16 MG/DL (ref 5–40)
WBC # BLD AUTO: 5.14 10*3/MM3 (ref 3.4–10.8)
WBC #/AREA URNS HPF: NORMAL /HPF

## 2023-10-10 RX ORDER — LEVOTHYROXINE SODIUM 0.1 MG/1
TABLET ORAL
Qty: 90 TABLET | Refills: 1 | Status: SHIPPED | OUTPATIENT
Start: 2023-10-10

## 2023-10-13 ENCOUNTER — TELEPHONE (OUTPATIENT)
Dept: FAMILY MEDICINE CLINIC | Facility: CLINIC | Age: 58
End: 2023-10-13

## 2023-12-14 ENCOUNTER — OFFICE VISIT (OUTPATIENT)
Dept: FAMILY MEDICINE CLINIC | Facility: CLINIC | Age: 58
End: 2023-12-14
Payer: COMMERCIAL

## 2023-12-14 VITALS
HEIGHT: 74 IN | SYSTOLIC BLOOD PRESSURE: 162 MMHG | OXYGEN SATURATION: 96 % | BODY MASS INDEX: 37.22 KG/M2 | RESPIRATION RATE: 16 BRPM | WEIGHT: 290 LBS | DIASTOLIC BLOOD PRESSURE: 94 MMHG | HEART RATE: 68 BPM | TEMPERATURE: 97.2 F

## 2023-12-14 DIAGNOSIS — E03.9 ACQUIRED HYPOTHYROIDISM: Primary | ICD-10-CM

## 2023-12-14 DIAGNOSIS — Z12.5 SCREENING PSA (PROSTATE SPECIFIC ANTIGEN): ICD-10-CM

## 2023-12-14 DIAGNOSIS — F33.42 RECURRENT MAJOR DEPRESSIVE DISORDER, IN FULL REMISSION: ICD-10-CM

## 2023-12-14 DIAGNOSIS — E66.01 MORBIDLY OBESE: ICD-10-CM

## 2023-12-14 DIAGNOSIS — E55.9 VITAMIN D DEFICIENCY DISEASE: ICD-10-CM

## 2023-12-14 DIAGNOSIS — E53.8 LOW SERUM VITAMIN B12: ICD-10-CM

## 2023-12-14 DIAGNOSIS — E78.2 MIXED HYPERLIPIDEMIA: ICD-10-CM

## 2023-12-14 DIAGNOSIS — E53.8 LOW FOLIC ACID: ICD-10-CM

## 2023-12-14 RX ORDER — LEVOTHYROXINE SODIUM 0.1 MG/1
TABLET ORAL
Qty: 90 TABLET | Refills: 1 | Status: SHIPPED | OUTPATIENT
Start: 2023-12-14

## 2023-12-14 RX ORDER — MAGNESIUM 200 MG
TABLET ORAL
Qty: 90 EACH | Refills: 3 | Status: SHIPPED | OUTPATIENT
Start: 2023-12-14

## 2023-12-14 RX ORDER — VALSARTAN 160 MG/1
TABLET ORAL
Qty: 30 TABLET | Refills: 1 | Status: SHIPPED | OUTPATIENT
Start: 2023-12-14 | End: 2023-12-14

## 2023-12-14 RX ORDER — VALSARTAN 160 MG/1
TABLET ORAL
Qty: 90 TABLET | Refills: 0 | Status: SHIPPED | OUTPATIENT
Start: 2023-12-14

## 2023-12-14 NOTE — PROGRESS NOTES
"Subjective   Reynaldo Madden is a 58 y.o. male.     Hypertension  Pertinent negatives include no blurred vision.         Since the last visit, he has overall felt fairly well.  He has New diagnosis today of Primary Hypertension and will start medication, Impaired fasting glucose and will monitor labs to watch for DMII, Hyperlipidemia on Zetia and is effective with lowering lipid values, Hypothyroidism well controlled on current medication and will continue Rx, and Vitamin D deficiency and labs are at goal >30 ng/mL.  he has been compliant with current medications have reviewed them.  The patient denies medication side effects.  Will refill medications. /92   Pulse 68   Temp 97.2 °F (36.2 °C)   Resp 16   Ht 188 cm (74.02\")   Wt 132 kg (290 lb)   SpO2 96%   BMI 37.21 kg/m²   Not much exercise  Results for orders placed or performed in visit on 11/21/23   TSH    Specimen: Blood   Result Value Ref Range    TSH 3.690 0.270 - 4.200 uIU/mL   T4, Free    Specimen: Blood   Result Value Ref Range    Free T4 1.54 0.93 - 1.70 ng/dL   T3, Free    Specimen: Blood   Result Value Ref Range    T3, Free 3.3 2.0 - 4.4 pg/mL     Use Cpap/Bipap every night  Still on Pristiq for anxiety and depression--=works  On B12 and D  Watching borderline HTN  On Zetia----intol statins---LFTs  He did see nephrology  3-1-21 and I do want a note that I am expected that his creatinine will run normally around 1.4-1.5.     Did see cardio------DR Kumari 7-13-21---neg stress echo---had work up abn EKG--neg   Statins raise his LFTs; on Zetia    The following portions of the patient's history were reviewed and updated as appropriate: allergies, current medications, past family history, past medical history, past social history, past surgical history, and problem list.    Review of Systems   Constitutional:  Negative for diaphoresis.   HENT:  Negative for nosebleeds and trouble swallowing.    Eyes:  Negative for blurred vision and visual " disturbance.   Respiratory:  Negative for choking.    Gastrointestinal:  Negative for blood in stool.   Allergic/Immunologic: Negative for immunocompromised state.   Neurological:  Negative for facial asymmetry and speech difficulty.   Psychiatric/Behavioral:  Negative for self-injury and suicidal ideas.        Objective   Physical Exam  Vitals and nursing note reviewed.   Constitutional:       General: He is not in acute distress.     Appearance: He is well-developed. He is obese. He is not diaphoretic.   HENT:      Head: Normocephalic.   Eyes:      Conjunctiva/sclera: Conjunctivae normal.      Pupils: Pupils are equal, round, and reactive to light.   Neck:      Vascular: No carotid bruit.   Cardiovascular:      Rate and Rhythm: Normal rate and regular rhythm.      Pulses: Normal pulses.      Heart sounds: Normal heart sounds. No murmur heard.  Pulmonary:      Effort: Pulmonary effort is normal.      Breath sounds: Normal breath sounds.   Musculoskeletal:         General: Normal range of motion.      Cervical back: Normal range of motion and neck supple.      Right lower leg: No edema.      Left lower leg: No edema.   Skin:     General: Skin is warm and dry.      Findings: No rash.   Neurological:      Mental Status: He is alert and oriented to person, place, and time.   Psychiatric:         Mood and Affect: Mood normal. Affect is not inappropriate.         Behavior: Behavior normal.         Thought Content: Thought content normal.         Judgment: Judgment normal.           Assessment & Plan   Diagnoses and all orders for this visit:    1. Acquired hypothyroidism (Primary)    2. Mixed hyperlipidemia    3. Vitamin D deficiency disease    4. Recurrent major depressive disorder, in full remission    5. Low serum vitamin B12    6. Low folic acid    7. Morbidly obese    8. Screening PSA (prostate specific antigen)    Other orders  -     Cyanocobalamin (Vitamin B-12) 1000 MCG sublingual tablet; One SL daily  Dispense:  90 each; Refill: 3  -     levothyroxine (SYNTHROID, LEVOTHROID) 100 MCG tablet; One PO daily before breakfast for thyroid  Dispense: 90 tablet; Refill: 1      Restart B12 and cont folic acid and Vit d----will check labs in few mos  Due for PSA--lab----get labs again in 2 mos---update A1C    Plan, Reynaldo Madden, was seen today.  he was seen for HTN and will start medication, Imparied fasting glucose and plan follow up labs, diet, and exercise, Hyperlipidemia and will continue current medication, Hypothyroidism well controlled, continue medication, and Vitamin D deficiency and supplemented.

## 2024-01-13 ENCOUNTER — PRIOR AUTHORIZATION (OUTPATIENT)
Dept: FAMILY MEDICINE CLINIC | Facility: CLINIC | Age: 59
End: 2024-01-13
Payer: COMMERCIAL

## 2024-01-13 NOTE — TELEPHONE ENCOUNTER
Your request has been approved  CaseId:[id number];Status:Approved;Review Type:Prior Auth;Coverage Start Date:12/14/2023;Coverage End Date:01/12/2025;

## 2024-01-30 ENCOUNTER — OFFICE VISIT (OUTPATIENT)
Dept: FAMILY MEDICINE CLINIC | Facility: CLINIC | Age: 59
End: 2024-01-30
Payer: COMMERCIAL

## 2024-01-30 VITALS
BODY MASS INDEX: 36.45 KG/M2 | TEMPERATURE: 97.2 F | HEART RATE: 66 BPM | WEIGHT: 284 LBS | SYSTOLIC BLOOD PRESSURE: 124 MMHG | RESPIRATION RATE: 16 BRPM | OXYGEN SATURATION: 97 % | DIASTOLIC BLOOD PRESSURE: 80 MMHG | HEIGHT: 74 IN

## 2024-01-30 DIAGNOSIS — I10 PRIMARY HYPERTENSION: Primary | ICD-10-CM

## 2024-01-30 PROCEDURE — 99213 OFFICE O/P EST LOW 20 MIN: CPT | Performed by: PHYSICIAN ASSISTANT

## 2024-01-30 RX ORDER — VALSARTAN 160 MG/1
TABLET ORAL
Qty: 90 TABLET | Refills: 1 | Status: SHIPPED | OUTPATIENT
Start: 2024-01-30

## 2024-01-30 NOTE — PROGRESS NOTES
"Subjective   Reynaldo Madden is a 58 y.o. male.     Hypertension  Pertinent negatives include no blurred vision, chest pain or shortness of breath.         Since the last visit, he has overall felt well.  He has  new diagnoses last visit of primary hypertension and today is follow-up from starting valsartan.  Blood pressure is now controlled and will continue same dose.  Also noted last visit of his impaired fasting glucose monitoring with hemoglobin A1c every 6 months, mixed hyperlipidemia treated with Setia, hypothyroidism and confirmed labs are at goal with December labs..  Today is follow-up for his blood pressure .  he has been compliant with current medications have reviewed them.  The patient denies medication side effects.  Will refill medications. /80   Pulse 66   Temp 97.2 °F (36.2 °C)   Resp 16   Ht 188 cm (74.02\")   Wt 129 kg (284 lb)   SpO2 97%   BMI 36.44 kg/m²   Has not been taking bp at home.  Results for orders placed or performed in visit on 11/21/23   TSH    Specimen: Blood   Result Value Ref Range    TSH 3.690 0.270 - 4.200 uIU/mL   T4, Free    Specimen: Blood   Result Value Ref Range    Free T4 1.54 0.93 - 1.70 ng/dL   T3, Free    Specimen: Blood   Result Value Ref Range    T3, Free 3.3 2.0 - 4.4 pg/mL     Still need restart B12---- he is taking folic acid and D  Weight down some  Use Cpap/Bipap every night      Use Cpap/Bipap every night  Still on Pristiq for anxiety and depression--=works  On B12 and D  Watching borderline HTN  On Zetia----intol statins---LFTs  He did see nephrology  3-1-21 and I do want a note that I am expected that his creatinine will run normally around 1.4-1.5.     Did see cardio------DR Kumari 7-13-21---neg stress echo---had work up abn EKG--neg   Statins raise his LFTs; on Zetia    The following portions of the patient's history were reviewed and updated as appropriate: allergies, current medications, past family history, past medical history, past social " history, past surgical history, and problem list.    Review of Systems   Constitutional:  Negative for activity change, diaphoresis, fever and unexpected weight gain.   HENT:  Negative for nosebleeds and trouble swallowing.    Eyes:  Negative for blurred vision and visual disturbance.   Respiratory:  Negative for choking, shortness of breath and wheezing.    Cardiovascular:  Negative for chest pain.   Gastrointestinal:  Negative for abdominal pain and blood in stool.   Allergic/Immunologic: Negative for immunocompromised state.   Neurological:  Negative for facial asymmetry and speech difficulty.   Psychiatric/Behavioral:  Negative for behavioral problems, self-injury and suicidal ideas.        Objective   Physical Exam  Vitals and nursing note reviewed.   Constitutional:       General: He is not in acute distress.     Appearance: He is well-developed. He is not diaphoretic.   HENT:      Head: Normocephalic.   Eyes:      Conjunctiva/sclera: Conjunctivae normal.      Pupils: Pupils are equal, round, and reactive to light.   Cardiovascular:      Rate and Rhythm: Normal rate and regular rhythm.      Heart sounds: Normal heart sounds. No murmur heard.  Pulmonary:      Effort: Pulmonary effort is normal.      Breath sounds: Normal breath sounds.   Musculoskeletal:         General: Normal range of motion.      Cervical back: Normal range of motion and neck supple.   Skin:     General: Skin is warm and dry.      Findings: No rash.   Neurological:      Mental Status: He is alert and oriented to person, place, and time.   Psychiatric:         Mood and Affect: Affect is not inappropriate.         Behavior: Behavior normal.         Thought Content: Thought content normal.         Judgment: Judgment normal.           Assessment & Plan   Diagnoses and all orders for this visit:    1. Primary hypertension (Primary)      Use Cpap/Bipap every night    Labs due April  Thyroid labs at tx goal 12-8-23  Tiara, Reynaldo Madden, was seen  today.  he was seen for  primary hypertension new diagnosis last visit today is follow-up from starting valsartan blood pressure is at goal and will continue same dose and follow-up in June and labs are due in April .

## 2024-04-13 DIAGNOSIS — N28.9 RENAL IMPAIRMENT: ICD-10-CM

## 2024-04-13 DIAGNOSIS — I10 PRIMARY HYPERTENSION: Primary | ICD-10-CM

## 2024-04-13 LAB
25(OH)D3+25(OH)D2 SERPL-MCNC: 25.9 NG/ML (ref 30–100)
ALBUMIN SERPL-MCNC: 4.3 G/DL (ref 3.8–4.9)
ALBUMIN/GLOB SERPL: 1.5 {RATIO} (ref 1.2–2.2)
ALP SERPL-CCNC: 105 IU/L (ref 44–121)
ALT SERPL-CCNC: 21 IU/L (ref 0–44)
AST SERPL-CCNC: 19 IU/L (ref 0–40)
BASOPHILS # BLD AUTO: NORMAL 10*3/UL
BILIRUB SERPL-MCNC: 0.6 MG/DL (ref 0–1.2)
BUN SERPL-MCNC: 17 MG/DL (ref 6–24)
BUN/CREAT SERPL: 10 (ref 9–20)
CALCIUM SERPL-MCNC: 9.3 MG/DL (ref 8.7–10.2)
CHLORIDE SERPL-SCNC: 101 MMOL/L (ref 96–106)
CHOLEST SERPL-MCNC: 236 MG/DL (ref 100–199)
CO2 SERPL-SCNC: 22 MMOL/L (ref 20–29)
CREAT SERPL-MCNC: 1.71 MG/DL (ref 0.76–1.27)
EGFRCR SERPLBLD CKD-EPI 2021: 46 ML/MIN/1.73
EOSINOPHIL # BLD AUTO: NORMAL 10*3/UL
EOSINOPHIL NFR BLD AUTO: NORMAL %
FOLATE SERPL-MCNC: >20 NG/ML
GLOBULIN SER CALC-MCNC: 2.9 G/DL (ref 1.5–4.5)
GLUCOSE SERPL-MCNC: 86 MG/DL (ref 70–99)
HBA1C MFR BLD: 6 % (ref 4.8–5.6)
HCT VFR BLD AUTO: NORMAL %
HDLC SERPL-MCNC: 47 MG/DL
HGB BLD-MCNC: NORMAL G/DL
LDLC SERPL CALC-MCNC: 176 MG/DL (ref 0–99)
LYMPHOCYTES # BLD AUTO: NORMAL 10*3/UL
LYMPHOCYTES NFR BLD AUTO: NORMAL %
MONOCYTES NFR BLD AUTO: NORMAL %
NEUTROPHILS NFR BLD AUTO: NORMAL %
PLATELET # BLD AUTO: NORMAL 10*3/UL
POTASSIUM SERPL-SCNC: 4.9 MMOL/L (ref 3.5–5.2)
PROT SERPL-MCNC: 7.2 G/DL (ref 6–8.5)
PSA SERPL-MCNC: 1.6 NG/ML (ref 0–4)
RBC # BLD AUTO: NORMAL 10*6/UL
SODIUM SERPL-SCNC: 138 MMOL/L (ref 134–144)
SPECIMEN STATUS: NORMAL
TRIGL SERPL-MCNC: 73 MG/DL (ref 0–149)
TSH SERPL DL<=0.005 MIU/L-ACNC: 4.19 UIU/ML (ref 0.45–4.5)
VIT B12 SERPL-MCNC: 757 PG/ML (ref 232–1245)
VLDLC SERPL CALC-MCNC: 13 MG/DL (ref 5–40)
WBC # BLD AUTO: NORMAL X10E3/UL

## 2024-04-18 ENCOUNTER — OFFICE VISIT (OUTPATIENT)
Dept: SLEEP MEDICINE | Facility: HOSPITAL | Age: 59
End: 2024-04-18
Payer: COMMERCIAL

## 2024-04-18 VITALS — BODY MASS INDEX: 36.96 KG/M2 | HEART RATE: 76 BPM | WEIGHT: 288 LBS | HEIGHT: 74 IN | OXYGEN SATURATION: 95 %

## 2024-04-18 DIAGNOSIS — E66.9 CLASS 2 OBESITY: ICD-10-CM

## 2024-04-18 DIAGNOSIS — G47.33 OSA ON CPAP: Primary | ICD-10-CM

## 2024-04-18 LAB
ALBUMIN/CREAT UR: <2 MG/G CREAT (ref 0–29)
APPEARANCE UR: CLEAR
BACTERIA #/AREA URNS HPF: NORMAL /HPF
BASOPHILS # BLD AUTO: 0.03 10*3/MM3 (ref 0–0.2)
BASOPHILS NFR BLD AUTO: 0.5 % (ref 0–1.5)
BILIRUB UR QL STRIP: NEGATIVE
BUN SERPL-MCNC: 15 MG/DL (ref 6–20)
BUN/CREAT SERPL: 9.1 (ref 7–25)
CALCIUM SERPL-MCNC: 9.5 MG/DL (ref 8.6–10.5)
CASTS URNS MICRO: NORMAL
CHLORIDE SERPL-SCNC: 103 MMOL/L (ref 98–107)
CO2 SERPL-SCNC: 25.9 MMOL/L (ref 22–29)
COLOR UR: YELLOW
CREAT SERPL-MCNC: 1.64 MG/DL (ref 0.76–1.27)
CREAT UR-MCNC: 130.3 MG/DL
EGFRCR SERPLBLD CKD-EPI 2021: 48.2 ML/MIN/1.73
EOSINOPHIL # BLD AUTO: 0.13 10*3/MM3 (ref 0–0.4)
EOSINOPHIL NFR BLD AUTO: 2.2 % (ref 0.3–6.2)
EPI CELLS #/AREA URNS HPF: NORMAL /HPF
ERYTHROCYTE [DISTWIDTH] IN BLOOD BY AUTOMATED COUNT: 13.1 % (ref 12.3–15.4)
GLUCOSE SERPL-MCNC: 91 MG/DL (ref 65–99)
GLUCOSE UR QL STRIP: NEGATIVE
HCT VFR BLD AUTO: 42.8 % (ref 37.5–51)
HGB BLD-MCNC: 14.6 G/DL (ref 13–17.7)
HGB UR QL STRIP: NEGATIVE
IMM GRANULOCYTES # BLD AUTO: 0.02 10*3/MM3 (ref 0–0.05)
IMM GRANULOCYTES NFR BLD AUTO: 0.3 % (ref 0–0.5)
KETONES UR QL STRIP: NEGATIVE
LEUKOCYTE ESTERASE UR QL STRIP: NEGATIVE
LYMPHOCYTES # BLD AUTO: 2.03 10*3/MM3 (ref 0.7–3.1)
LYMPHOCYTES NFR BLD AUTO: 34.3 % (ref 19.6–45.3)
MCH RBC QN AUTO: 31.1 PG (ref 26.6–33)
MCHC RBC AUTO-ENTMCNC: 34.1 G/DL (ref 31.5–35.7)
MCV RBC AUTO: 91.1 FL (ref 79–97)
MICROALBUMIN UR-MCNC: <3 UG/ML
MONOCYTES # BLD AUTO: 0.35 10*3/MM3 (ref 0.1–0.9)
MONOCYTES NFR BLD AUTO: 5.9 % (ref 5–12)
NEUTROPHILS # BLD AUTO: 3.35 10*3/MM3 (ref 1.7–7)
NEUTROPHILS NFR BLD AUTO: 56.8 % (ref 42.7–76)
NITRITE UR QL STRIP: NEGATIVE
NRBC BLD AUTO-RTO: 0 /100 WBC (ref 0–0.2)
PH UR STRIP: 6.5 [PH] (ref 5–8)
PLATELET # BLD AUTO: 276 10*3/MM3 (ref 140–450)
POTASSIUM SERPL-SCNC: 4.7 MMOL/L (ref 3.5–5.2)
PROT UR QL STRIP: NEGATIVE
RBC # BLD AUTO: 4.7 10*6/MM3 (ref 4.14–5.8)
RBC #/AREA URNS HPF: NORMAL /HPF
SODIUM SERPL-SCNC: 139 MMOL/L (ref 136–145)
SP GR UR STRIP: 1.02 (ref 1–1.03)
UROBILINOGEN UR STRIP-MCNC: NORMAL MG/DL
WBC # BLD AUTO: 5.91 10*3/MM3 (ref 3.4–10.8)
WBC #/AREA URNS HPF: NORMAL /HPF

## 2024-04-18 PROCEDURE — G0463 HOSPITAL OUTPT CLINIC VISIT: HCPCS

## 2024-04-18 PROCEDURE — 99213 OFFICE O/P EST LOW 20 MIN: CPT | Performed by: INTERNAL MEDICINE

## 2024-04-18 NOTE — PROGRESS NOTES
"  [facility]  [address]  [address]  [place] [address]  Phone   Fax       SLEEP CLINIC FOLLOW UP PROGRESS NOTE.    Reynaldo Madden  [id number]   1965  58 y.o.  male      PCP: Kathleen Benjamin PA-C      Date of visit: 4/18/2024    Chief Complaint   Patient presents with    Sleep Apnea    Obesity       HPI:  This is a 58 y.o. years old patient is here for the management of obstructive sleep apnea.  Sleep apnea is moderate in severity with a AHI of 21/hr. Patient is using positive airway pressure therapy with auto CPAP and the symptoms of sleep apnea have improved significantly on the therapy. Normally patient goes to bed at 1030 PM and wakes up at 6-7 AM .  The patient wakes up 1-2 time(s) during the night and has no problem going back to sleep.  Feels refreshed after waking up.  He is having problems with the mask    Medications and allergies are reviewed by me and documented in the encounter.     SOCIAL (habits pertaining to sleep medicine)  History tobacco use:No   History of alcohol use: 0 per week  Caffeine use: 2     REVIEW OF SYSTEMS:   Pertaining positive symptoms are:  [place] Sleepiness Scale :Total score: 7         PHYSICAL EXAMINATION:  CONSTITUTIONAL:  Vitals:    04/18/24 0900   Pulse: 76   SpO2: 95%   Weight: 131 kg (288 lb)   Height: 188 cm (74.02\")    Body mass index is 36.96 kg/m².   NOSE: nasal passages are clear, No deformities noted   RESP SYSTEM: Not in any respiratory distress, no chest deformities noted,   CARDIOVASULAR: No edema noted  NEURO: Oriented x 3, gait normal,  Mood and affect appeared appropriate      Data reviewed:  The Smart card downloaded on 4/18/2024 has been reviewed independently by me for compliance and discussed the data with the patient.   Compliance; 84%  More than 4 hr use, 72%  Average use of the device 5 hours and 20 minutes per night  Residual AHI: 0.3 /hr (goal < 5.0 /hr)  Mask type: Fullface mask  Device: " [employer]  DME: [employer]      ASSESSMENT AND PLAN:  Obstructive sleep apnea ( G 47.33).  The symptoms of sleep apnea have improved with the device and the treatment.  Patient's compliance with the device is excellent for treatment of sleep apnea.  I have independently reviewed the smart card down load and discussed with the patient the download data and encouarged the patient to continue to use the device.The residual AHI is acceptable. The device is benefiting the patient and the device is medically necessary.  Without proper control of sleep apnea and good compliance there is a increased risk for hypertension, diabetes mellitus and nonrestorative sleep with hypersomnia which can increase risk for motor vehicle accidents.  Untreated sleep apnea is also a risk factor for development of atrial fibrillation, pulmonary hypertension, insulin resistance and stroke. The patient is also instructed to get the supplies from the DME company and and change them on a regular basis.  A prescription for supplies has been sent to the DME company.  I have also discussed the good sleep hygiene habits and adequate amount of sleep needed for good health.  Obesity  2 with BMI is Body mass index is 36.96 kg/m².. I have discuss the relationship between the weight and sleep apnea. The benefit of weight loss in reducing severity of sleep apnea was discussed. Discussed diet and exercise with the patient to achieve ideal BMI.   Return in about 1 year (around 4/18/2025). . Patient's questions were answered.    4/18/2024  Scott Montero MD  Sleep Medicine.  Medical Director,   [facility] sleep centers.

## 2024-06-24 RX ORDER — DESVENLAFAXINE 100 MG/1
100 TABLET, EXTENDED RELEASE ORAL DAILY
Qty: 90 TABLET | Refills: 1 | Status: SHIPPED | OUTPATIENT
Start: 2024-06-24 | End: 2024-06-28 | Stop reason: SDUPTHER

## 2024-06-28 ENCOUNTER — OFFICE VISIT (OUTPATIENT)
Dept: FAMILY MEDICINE CLINIC | Facility: CLINIC | Age: 59
End: 2024-06-28
Payer: COMMERCIAL

## 2024-06-28 VITALS
WEIGHT: 290 LBS | HEART RATE: 65 BPM | TEMPERATURE: 97.4 F | HEIGHT: 74 IN | OXYGEN SATURATION: 96 % | RESPIRATION RATE: 16 BRPM | SYSTOLIC BLOOD PRESSURE: 126 MMHG | BODY MASS INDEX: 37.22 KG/M2 | DIASTOLIC BLOOD PRESSURE: 72 MMHG

## 2024-06-28 DIAGNOSIS — E53.8 LOW SERUM VITAMIN B12: ICD-10-CM

## 2024-06-28 DIAGNOSIS — N52.9 ERECTILE DYSFUNCTION, UNSPECIFIED ERECTILE DYSFUNCTION TYPE: ICD-10-CM

## 2024-06-28 DIAGNOSIS — F33.42 RECURRENT MAJOR DEPRESSIVE DISORDER, IN FULL REMISSION: ICD-10-CM

## 2024-06-28 DIAGNOSIS — E03.9 ACQUIRED HYPOTHYROIDISM: ICD-10-CM

## 2024-06-28 DIAGNOSIS — E78.2 MIXED HYPERLIPIDEMIA: ICD-10-CM

## 2024-06-28 DIAGNOSIS — G47.33 OSA ON CPAP: ICD-10-CM

## 2024-06-28 DIAGNOSIS — I10 PRIMARY HYPERTENSION: Primary | ICD-10-CM

## 2024-06-28 DIAGNOSIS — E55.9 VITAMIN D DEFICIENCY DISEASE: ICD-10-CM

## 2024-06-28 DIAGNOSIS — E55.9 VITAMIN D DEFICIENCY: ICD-10-CM

## 2024-06-28 PROCEDURE — 99214 OFFICE O/P EST MOD 30 MIN: CPT | Performed by: PHYSICIAN ASSISTANT

## 2024-06-28 RX ORDER — EZETIMIBE 10 MG/1
10 TABLET ORAL DAILY
Qty: 90 TABLET | Refills: 3 | Status: SHIPPED | OUTPATIENT
Start: 2024-06-28

## 2024-06-28 RX ORDER — VALSARTAN 160 MG/1
TABLET ORAL
Qty: 90 TABLET | Refills: 1 | Status: SHIPPED | OUTPATIENT
Start: 2024-06-28

## 2024-06-28 RX ORDER — SILDENAFIL CITRATE 20 MG/1
TABLET ORAL
Qty: 30 TABLET | Refills: 11 | Status: SHIPPED | OUTPATIENT
Start: 2024-06-28

## 2024-06-28 RX ORDER — DESVENLAFAXINE 100 MG/1
100 TABLET, EXTENDED RELEASE ORAL DAILY
Qty: 90 TABLET | Refills: 3 | Status: SHIPPED | OUTPATIENT
Start: 2024-06-28

## 2024-06-28 RX ORDER — FOLIC ACID 1 MG/1
1 TABLET ORAL DAILY
Qty: 90 TABLET | Refills: 3 | Status: SHIPPED | OUTPATIENT
Start: 2024-06-28

## 2024-06-28 RX ORDER — LEVOTHYROXINE SODIUM 0.1 MG/1
TABLET ORAL
Qty: 90 TABLET | Refills: 3 | Status: SHIPPED | OUTPATIENT
Start: 2024-06-28

## 2024-06-28 NOTE — PROGRESS NOTES
"Subjective   Reynaldo Madden is a 58 y.o. male.     History of Present Illness    Since the last visit, he has overall felt well.  He has Primary Hypertension and well controlled on current medication, Impaired fasting glucose and will monitor labs to watch for DMII, Hyperlipidemia on Zetia and is effective with lowering lipid values, Hypothyroidism well controlled on current medication and will continue Rx, and Vitamin D deficiency and will update labs for continued management.  he has been compliant with current medications have reviewed them.  The patient denies medication side effects.  Will refill medications. /72   Pulse 65   Temp 97.4 °F (36.3 °C)   Resp 16   Ht 188 cm (74.02\")   Wt 132 kg (290 lb)   SpO2 96%   BMI 37.21 kg/m² .      Last A1C was higher ---6%----went up.     Results for orders placed or performed in visit on 04/13/24   Basic Metabolic Panel    Specimen: Blood   Result Value Ref Range    Glucose 91 65 - 99 mg/dL    BUN 15 6 - 20 mg/dL    Creatinine 1.64 (H) 0.76 - 1.27 mg/dL    EGFR Result 48.2 (L) >60.0 mL/min/1.73    BUN/Creatinine Ratio 9.1 7.0 - 25.0    Sodium 139 136 - 145 mmol/L    Potassium 4.7 3.5 - 5.2 mmol/L    Chloride 103 98 - 107 mmol/L    Total CO2 25.9 22.0 - 29.0 mmol/L    Calcium 9.5 8.6 - 10.5 mg/dL   Microalbumin / Creatinine Urine Ratio - Urine, Clean Catch    Specimen: Urine, Clean Catch   Result Value Ref Range    Creatinine, Urine 130.3 Not Estab. mg/dL    Microalbumin, Urine <3.0 Not Estab. ug/mL    Microalbumin/Creatinine Ratio <2 0 - 29 mg/g creat   Microscopic Examination -   Result Value Ref Range    WBC, UA 0-2 /HPF    RBC, UA 0-2 /HPF    Epithelial Cells (non renal) 0-2 /HPF    Cast Type Comment     Bacteria, UA Comment None Seen /HPF   CBC & Differential    Specimen: Blood   Result Value Ref Range    WBC 5.91 3.40 - 10.80 10*3/mm3    RBC 4.70 4.14 - 5.80 10*6/mm3    Hemoglobin 14.6 13.0 - 17.7 g/dL    Hematocrit 42.8 37.5 - 51.0 %    MCV 91.1 79.0 - " 97.0 fL    MCH 31.1 26.6 - 33.0 pg    MCHC 34.1 31.5 - 35.7 g/dL    RDW 13.1 12.3 - 15.4 %    Platelets 276 140 - 450 10*3/mm3    Neutrophil Rel % 56.8 42.7 - 76.0 %    Lymphocyte Rel % 34.3 19.6 - 45.3 %    Monocyte Rel % 5.9 5.0 - 12.0 %    Eosinophil Rel % 2.2 0.3 - 6.2 %    Basophil Rel % 0.5 0.0 - 1.5 %    Neutrophils Absolute 3.35 1.70 - 7.00 10*3/mm3    Lymphocytes Absolute 2.03 0.70 - 3.10 10*3/mm3    Monocytes Absolute 0.35 0.10 - 0.90 10*3/mm3    Eosinophils Absolute 0.13 0.00 - 0.40 10*3/mm3    Basophils Absolute 0.03 0.00 - 0.20 10*3/mm3    Immature Granulocyte Rel % 0.3 0.0 - 0.5 %    Immature Grans Absolute 0.02 0.00 - 0.05 10*3/mm3    nRBC 0.0 0.0 - 0.2 /100 WBC   Urinalysis With Microscopic - Urine, Clean Catch    Specimen: Urine, Clean Catch   Result Value Ref Range    Specific Gravity, UA 1.016 1.005 - 1.030    pH, UA 6.5 5.0 - 8.0    Color, UA Yellow     Appearance, UA Clear Clear    Leukocytes, UA Negative Negative    Protein Negative Negative    Glucose, UA Negative Negative    Ketones Negative Negative    Blood, UA Negative Negative    Bilirubin, UA Negative Negative    Urobilinogen, UA Comment     Nitrite, UA Negative Negative   Labs were obtained on 4/17/2024 and noted renal function had improved.  Urine for creatinine microalbumin ratio was less than 30, normal.  Electrolytes were checked due to adding the valsartan and were in normal range.  Normal blood count  Labs from 4/11/2024 noted hemoglobin A1c increased to 6% and vitamin D was low.  His last visit was 1/30/2024 and I noted to restart B12.  Plus follow-up visit for new diagnosis of primary hypertension with treatment valsartan.  Blood pressure was at visit was at goal.  Also noted to continue Pristiq for depression that was working well.  And continue taking folic acid and vitamin D.  Note patient on Zetia and intolerant to statins due to raises his LFTs.  Also He did see nephrology  3-1-21 and I do want a note that I am expected  "that his creatinine will run normally around 1.4-1.5.     Did see cardio------DR Kumari 7-13-21---neg stress echo---had work up abn EKG--neg   Since our last visit he has been to sleep medicine clinic and had follow-up on his CPAP on 4/18/2024.    Do consider Ca Cardiac CT score----if he has significant coronary artery disease may want to consider the injectable cholesterol meds because he cannot take statins due to LFT elevation  Reynaldo Madden male 58 y.o., /72   Pulse 65   Temp 97.4 °F (36.3 °C)   Resp 16   Ht 188 cm (74.02\")   Wt 132 kg (290 lb)   SpO2 96%   BMI 37.21 kg/m²   who presents today for follow up of Depression.  He reports medication is working well, patient desires to continue on Rx, and needs refill. Onset of symptoms was approximately a few years ago. He denies current suicidal and homicidal ideation. Risk factors are family history of anxiety and or depression and lifestyle of multiple roles.  Previous treatment includes current Rx. He complains of the following medication side effects:none. The patient has previously been in counseling..  Prior Lexapro---sexual side effects  The following portions of the patient's history were reviewed and updated as appropriate: allergies, current medications, past family history, past medical history, past social history, past surgical history, and problem list.    Review of Systems   Constitutional:  Negative for diaphoresis.   HENT:  Negative for nosebleeds and trouble swallowing.    Eyes:  Negative for blurred vision and visual disturbance.   Respiratory:  Negative for choking.    Gastrointestinal:  Negative for blood in stool.   Allergic/Immunologic: Negative for immunocompromised state.   Neurological:  Negative for facial asymmetry and speech difficulty.   Psychiatric/Behavioral:  Negative for self-injury and suicidal ideas.        Objective   Physical Exam  Vitals and nursing note reviewed.   Constitutional:       General: He is not in " acute distress.     Appearance: He is well-developed. He is obese. He is not diaphoretic.   HENT:      Head: Normocephalic.   Eyes:      General: No scleral icterus.     Conjunctiva/sclera: Conjunctivae normal.      Pupils: Pupils are equal, round, and reactive to light.   Neck:      Vascular: No carotid bruit.   Cardiovascular:      Rate and Rhythm: Normal rate and regular rhythm.      Heart sounds: Normal heart sounds. No murmur heard.  Pulmonary:      Effort: Pulmonary effort is normal.      Breath sounds: Normal breath sounds. No rales.   Musculoskeletal:         General: Normal range of motion.      Cervical back: Normal range of motion and neck supple.      Right lower leg: No edema.      Left lower leg: No edema.   Skin:     General: Skin is warm and dry.      Findings: No rash.   Neurological:      Mental Status: He is alert and oriented to person, place, and time.   Psychiatric:         Mood and Affect: Mood normal. Affect is not inappropriate.         Behavior: Behavior normal.         Thought Content: Thought content normal.         Judgment: Judgment normal.           Assessment & Plan   Diagnoses and all orders for this visit:    1. Primary hypertension (Primary)    2. Acquired hypothyroidism    3. Vitamin D deficiency disease    4. Recurrent major depressive disorder, in full remission    5. JOSE ANGEL on CPAP    6. Erectile dysfunction, unspecified erectile dysfunction type    7. Mixed hyperlipidemia    8. Vitamin D deficiency        Plan, Reynaldo Madden, was seen today.  he was seen for HTN and continue medication, Imparied fasting glucose and plan follow up labs, diet, and exercise, Hyperlipidemia and will continue current medication, Hypothyroidism well controlled, continue medication, and Vitamin D deficiency and will update labs .  Use Cpap/Bipap every night  Do consider Ca Cardiac CT score----if he has significant coronary artery disease may want to consider the injectable cholesterol meds because he  cannot take statins due to LFT elevation  Can consider Wegovy for his insulin resistance and multiple risk factors to get his weight down I am avoiding metformin because of his creatinine level  Continue Pristiq working well for depression  Continue folic acid and B12 and add back vitamin D  Labs in October

## 2024-12-30 ENCOUNTER — OFFICE VISIT (OUTPATIENT)
Dept: FAMILY MEDICINE CLINIC | Facility: CLINIC | Age: 59
End: 2024-12-30
Payer: COMMERCIAL

## 2024-12-30 VITALS
BODY MASS INDEX: 37.99 KG/M2 | WEIGHT: 296 LBS | RESPIRATION RATE: 16 BRPM | TEMPERATURE: 97.5 F | DIASTOLIC BLOOD PRESSURE: 72 MMHG | OXYGEN SATURATION: 94 % | SYSTOLIC BLOOD PRESSURE: 119 MMHG | HEART RATE: 71 BPM | HEIGHT: 74 IN

## 2024-12-30 DIAGNOSIS — E55.9 VITAMIN D DEFICIENCY: ICD-10-CM

## 2024-12-30 DIAGNOSIS — I10 PRIMARY HYPERTENSION: Primary | ICD-10-CM

## 2024-12-30 DIAGNOSIS — G47.33 OSA ON CPAP: ICD-10-CM

## 2024-12-30 DIAGNOSIS — E03.9 ACQUIRED HYPOTHYROIDISM: ICD-10-CM

## 2024-12-30 DIAGNOSIS — N52.8 OTHER MALE ERECTILE DYSFUNCTION: ICD-10-CM

## 2024-12-30 DIAGNOSIS — F33.42 RECURRENT MAJOR DEPRESSIVE DISORDER, IN FULL REMISSION: ICD-10-CM

## 2024-12-30 DIAGNOSIS — R73.01 IMPAIRED FASTING GLUCOSE: ICD-10-CM

## 2024-12-30 DIAGNOSIS — E53.8 LOW SERUM VITAMIN B12: ICD-10-CM

## 2024-12-30 DIAGNOSIS — E66.01 MORBIDLY OBESE: ICD-10-CM

## 2024-12-30 DIAGNOSIS — E78.2 MIXED HYPERLIPIDEMIA: ICD-10-CM

## 2024-12-30 DIAGNOSIS — E55.9 VITAMIN D DEFICIENCY DISEASE: ICD-10-CM

## 2024-12-30 DIAGNOSIS — E53.8 LOW FOLIC ACID: ICD-10-CM

## 2024-12-30 PROCEDURE — 90656 IIV3 VACC NO PRSV 0.5 ML IM: CPT | Performed by: PHYSICIAN ASSISTANT

## 2024-12-30 PROCEDURE — 99214 OFFICE O/P EST MOD 30 MIN: CPT | Performed by: PHYSICIAN ASSISTANT

## 2024-12-30 PROCEDURE — 90471 IMMUNIZATION ADMIN: CPT | Performed by: PHYSICIAN ASSISTANT

## 2024-12-30 RX ORDER — VALSARTAN 160 MG/1
TABLET ORAL
Qty: 90 TABLET | Refills: 1 | Status: SHIPPED | OUTPATIENT
Start: 2024-12-30

## 2024-12-30 RX ORDER — PRAVASTATIN SODIUM 20 MG
20 TABLET ORAL DAILY
Qty: 90 TABLET | Refills: 3 | Status: SHIPPED | OUTPATIENT
Start: 2024-12-30

## 2024-12-30 NOTE — PROGRESS NOTES
Subjective   Reynaldo Madden is a 59 y.o. male.     History of Present Illness  The patient presents for a follow-up visit.    He has been adhering to his prescribed blood pressure medication regimen, which has effectively maintained his blood pressure within the target range. He occasionally monitors his blood pressure at home.    He continues to utilize his CPAP machine, although he experienced a cough due to drainage last night. He typically refrains from using the CPAP when coughing. He plans to discuss potential alternatives to the CPAP with his sleep medicine provider during their next encounter. He reports difficulty waking up at 7:00 AM despite going to bed around 9:30 PM, attributing this to fatigue. He does not experience daytime fatigue. He is compliant with his CPAP machine.    He has been managing his depression with Pristiq, which he finds beneficial. He does not endorse any feelings of sadness but reports a lack of happiness. He is currently engaged in therapy where he discusses various topics, including negative self-talk. He recalls a previous trial of Wellbutrin, which he did not find helpful.    He has been diagnosed with hypothyroidism and is currently on levothyroxine 100 mcg. His thyroid labs were within the target range during his last evaluation.    He has a history of allergies and receives an annual steroid injection from Dr. Pike, which effectively manages his symptoms. He also takes Zyrtec as needed. He recalls a period when his eyes were persistently watery, even during sleep, necessitating him to manually open his eyes upon waking.    He has been prescribed folic acid and requires refills. He is also on over-the-counter B12 supplements. He was advised to take vitamin D 2000 IU daily due to low levels but has not yet started this regimen. He has been prescribed generic Viagra for erectile dysfunction but has not been using it frequently.    He has a history of knee surgery performed  several years ago. He has a family history of heart disease, with his grandfather having passed away from heart-related issues. He has been advised to undergo colon cancer screening in 10 years. He engages in minimal physical activity and acknowledges the need for dietary improvements.    FAMILY HISTORY  His grandfather passed away from heart-related issues.    MEDICATIONS  Current: B12 pill, levothyroxine, Pristiq, folic acid, Zyrtec (as needed)  Past: Lexapro, Wellbutrin       The following portions of the patient's history were reviewed and updated as appropriate: allergies, current medications, past family history, past medical history, past social history, past surgical history, and problem list.    Review of Systems   Constitutional:  Negative for diaphoresis.   HENT:  Negative for nosebleeds and trouble swallowing.    Eyes:  Negative for blurred vision and visual disturbance.   Respiratory:  Negative for choking.    Gastrointestinal:  Negative for blood in stool.   Allergic/Immunologic: Negative for immunocompromised state.   Neurological:  Negative for facial asymmetry and speech difficulty.   Psychiatric/Behavioral:  Negative for self-injury and suicidal ideas.        Objective   Physical Exam  Vitals and nursing note reviewed.   Constitutional:       General: He is not in acute distress.     Appearance: He is well-developed. He is obese. He is not diaphoretic.   HENT:      Head: Normocephalic.      Right Ear: External ear normal.      Left Ear: External ear normal.   Eyes:      Conjunctiva/sclera: Conjunctivae normal.      Pupils: Pupils are equal, round, and reactive to light.   Cardiovascular:      Rate and Rhythm: Normal rate and regular rhythm.      Heart sounds: Normal heart sounds. No murmur heard.  Pulmonary:      Effort: Pulmonary effort is normal.      Breath sounds: Normal breath sounds. No rales.   Musculoskeletal:         General: Normal range of motion.      Cervical back: Normal range of  motion and neck supple.      Right lower leg: No edema.      Left lower leg: No edema.   Skin:     General: Skin is warm and dry.      Findings: No rash.   Neurological:      Mental Status: He is alert and oriented to person, place, and time.   Psychiatric:         Mood and Affect: Mood normal. Affect is not inappropriate.         Behavior: Behavior normal.         Thought Content: Thought content normal.         Judgment: Judgment normal.         Physical Exam  Vital Signs  Weight is almost 300 pounds.       Results  Laboratory Studies  Urine microalbumin creatinine ratio was less than 30. A1c was 6%. B12 was 864. Vitamin D was low. Creatinine runs around 1.4-1.5. LDL was 183. AST was 23 and ALT was 32.    Testing  Negative stress echo on 7/13/2021.       Assessment & Plan   Diagnoses and all orders for this visit:    1. Primary hypertension (Primary)  -     Hepatic Function Panel (6) ([facility])    2. Acquired hypothyroidism  -     Hepatic Function Panel (6) ([facility])    3. JOSE ANGEL on CPAP  -     Hepatic Function Panel (6) ([facility])    4. Morbidly obese  -     Hepatic Function Panel (6) ([facility])    5. Recurrent major depressive disorder, in full remission  -     Hepatic Function Panel (6) ([facility])    6. Vitamin D deficiency disease  -     Hepatic Function Panel (6) ([facility])    7. Mixed hyperlipidemia  -     Hepatic Function Panel (6) ([facility])    8. Vitamin D deficiency  -     Hepatic Function Panel (6) ([facility])    9. Low serum vitamin B12  -     Hepatic Function Panel (6) ([facility])    10. Low folic acid  -     Hepatic Function Panel (6) ([facility])    11. Impaired fasting glucose  -     Hepatic Function Panel (6) ([facility])    12. Other male erectile dysfunction        Assessment & Plan  1. Hypertension.  Blood pressure is well-controlled with the current medication regimen. He is advised to continue his current antihypertensive medication.    2. Obstructive Sleep Apnea.  He continues to use his CPAP machine, although  he experiences discomfort when coughing due to drainage. He is encouraged to continue using the CPAP machine as long as he is not struggling to breathe. He is advised to follow up with sleep medicine to discuss potential alternatives, such as the Inspire device.    3. Impaired Fasting Glucose.  His A1c level has improved to 5.6% despite a weight gain of 6 pounds since his last visit. He is advised to maintain a healthy diet and exercise regularly to prevent progression to type 2 diabetes.    4. Hyperlipidemia.  Previous cholesterol medication was discontinued due to elevated liver enzymes. Liver ultrasound was normal. LDL cholesterol level is elevated at 183 mg/dL. He will be started on a statin, and liver function tests will be conducted in 1 month to monitor for any adverse effects.    5. Hypothyroidism.  Thyroid function tests (free T3, free T4, and TSH) are within the target range. He will continue his current dose of levothyroxine 100 mcg daily.    6. Depression.  He reports satisfaction with his current medication, Pristiq 100 mg daily. He is advised to continue his current dose and to consult with his therapist regarding any potential changes in his medication regimen.    7. Chronic Allergies.  He receives an annual steroid injection from his allergist and uses Zyrtec as needed for symptom relief. He is advised to continue this regimen and to follow up with his allergist as scheduled.    8. Vitamin D Deficiency.  Vitamin D levels are low. He is advised to start taking vitamin D 2000 IU daily.    9. Health Maintenance.  He is current on colon cancer screening and PSA screening as of 04/11/2024. He is advised to consider the Shingrix vaccine for shingles prevention once he reaches the age of 60.    PROCEDURE  The patient had knee surgery several years ago.     Him on pravastatin for the mixed hyperlipidemia and do follow-up liver enzymes 1 month.           Patient or patient representative verbalized consent  for the use of Ambient Listening during the visit with  Kathleen Benjamin PA-C for chart documentation. 12/30/2024  08:30 EST

## 2025-04-23 ENCOUNTER — OFFICE VISIT (OUTPATIENT)
Dept: SLEEP MEDICINE | Facility: HOSPITAL | Age: 60
End: 2025-04-23
Payer: COMMERCIAL

## 2025-04-23 VITALS — HEIGHT: 74 IN | HEART RATE: 74 BPM | OXYGEN SATURATION: 91 % | BODY MASS INDEX: 36.96 KG/M2 | WEIGHT: 288 LBS

## 2025-04-23 DIAGNOSIS — G47.33 OSA ON CPAP: Primary | ICD-10-CM

## 2025-04-23 DIAGNOSIS — E66.9 OBESITY (BMI 30-39.9): ICD-10-CM

## 2025-04-23 PROCEDURE — 99213 OFFICE O/P EST LOW 20 MIN: CPT | Performed by: FAMILY MEDICINE

## 2025-04-23 PROCEDURE — G0463 HOSPITAL OUTPT CLINIC VISIT: HCPCS

## 2025-04-23 NOTE — PROGRESS NOTES
"Follow Up [facility] Disorders [facility] Note     Chief Complaint:  JOSE ANGEL     Primary Care Physician: Kathleen Benjamin, CARROLL    Interval History:   The patient is a 59 y.o. male  who was last seen in the sleep lab: 4/18/2024.  Baseline AHI 21 events per hour on auto CPAP.  Baseline study from 1/17/2023.    Downloaded PAP Data Reviewed For Compliance:  DME is [employer].  Downloads between 20 2/20/2025 - 4/21/2025.  Average usage is 5 hours 24 minutes.  Average AHI is 0.3.  Average auto CPAP pressure is 10.4 cm H2O    I have reviewed the above results and compared them with the patient's last downloads and reviewed with the patient.    Review of Systems:    A complete review of systems was done and all were negative with the exception of see scanned media    Social History:    Social History     Socioeconomic History    Marital status:    Tobacco Use    Smoking status: Never     Passive exposure: Never    Smokeless tobacco: Never   Vaping Use    Vaping status: Never Used   Substance and Sexual Activity    Alcohol use: No    Drug use: No    Sexual activity: Defer       Allergies:  Anesthetics, amide     Medication Review:  Reviewed.      Vital Signs:    Vitals:    04/23/25 0858   Pulse: 74   SpO2: 91%   Weight: 131 kg (288 lb)   Height: 188 cm (74.02\")     Body mass index is 36.96 kg/m².    Physical Exam:    Constitutional:  Well developed 59 y.o. male that appears in no apparent distress.  Awake & oriented times 3.  Normal mood with normal recent and remote memory and normal judgement.  Eyes:  Conjunctivae normal.  Oropharynx: Previously, moist mucous membranes.    Self-administered Holiday Sleepiness Scale test results: See scanned media  0-5 Lower normal daytime sleepiness  6-10 Higher normal daytime sleepiness  11-12 Mild, 13-15 Moderate, & 16-24 Severe excessive daytime sleepiness    Impression:   1. JOSE ANGEL on CPAP    2. Obesity (BMI 30-39.9)        Obstructive sleep apnea adequately treated with auto CPAP 6-18 cm H2O. The " patient appears to be at goal with good compliance and usage.  Patient will consider mask fitting for a DreamWear full facemask and/or consider CPAP pillow.    Plan:  Good sleep hygiene measures should be maintained.  Weight loss would be beneficial in this patient who is obese by Body mass index is 36.96 kg/m²..      After evaluating the patient and assessing results available, the patient is benefiting from the treatment being provided.     The patient will continue CPAP.  After clinical evaluation and review of downloads, I recommend no changes to the patient's pressures.  A new prescription will be sent to the patient's DME.    Caution during activities that require prolonged concentration is strongly advised if sleepiness returns. Changing of PAP supplies regularly is important for effective use. Patient needs to change cushion on the mask or plugs on nasal pillows along with disposable filters once every month and change mask frame, tubing, headgear and Velcro straps every 6 months at the minimum.    I answered all of the patient's questions.  The patient will call for any problems and will follow up in 1 year.      Bonifacio Rendon MD  Sleep Medicine  04/23/25  09:21 EDT

## 2025-07-25 RX ORDER — DESVENLAFAXINE 100 MG/1
100 TABLET, EXTENDED RELEASE ORAL DAILY
Qty: 90 TABLET | Refills: 3 | Status: SHIPPED | OUTPATIENT
Start: 2025-07-25

## 2025-07-25 RX ORDER — VALSARTAN 160 MG/1
160 TABLET ORAL DAILY
Qty: 90 TABLET | Refills: 1 | Status: SHIPPED | OUTPATIENT
Start: 2025-07-25

## 2025-07-25 RX ORDER — FOLIC ACID 1 MG/1
1000 TABLET ORAL DAILY
Qty: 90 TABLET | Refills: 3 | Status: SHIPPED | OUTPATIENT
Start: 2025-07-25

## 2025-07-25 RX ORDER — PRAVASTATIN SODIUM 20 MG
20 TABLET ORAL DAILY
Qty: 90 TABLET | Refills: 3 | Status: SHIPPED | OUTPATIENT
Start: 2025-07-25

## 2025-07-25 RX ORDER — LEVOTHYROXINE SODIUM 100 UG/1
TABLET ORAL
Qty: 90 TABLET | Refills: 3 | Status: SHIPPED | OUTPATIENT
Start: 2025-07-25

## 2025-07-25 NOTE — TELEPHONE ENCOUNTER
"     Caller: Reynaldo Madden \"SALAS\"    Relationship: Self    Best call back number: [phone]     Requested Prescriptions:   Requested Prescriptions     Pending Prescriptions Disp Refills    levothyroxine (SYNTHROID, LEVOTHROID) 100 MCG tablet [Pharmacy Med Name: LEVOTHYROXINE 0.100MG (100MCG) TAB] 90 tablet 3     Sig: TAKE 1 TABLET BY MOUTH DAILY BEFORE BREAKFAST FOR THYROID    valsartan (DIOVAN) 160 MG tablet [Pharmacy Med Name: VALSARTAN 160MG TABLETS] 90 tablet 1     Sig: TAKE 1 TABLET BY MOUTH DAILY FOR BLOOD PRESSURE    desvenlafaxine (PRISTIQ) 100 MG 24 hr tablet [Pharmacy Med Name: DESVENLAFAXINE ER SUCCINATE 100MG T] 90 tablet 3     Sig: TAKE 1 TABLET BY MOUTH DAILY FOR DEPRESSION    folic acid (FOLVITE) 1 MG tablet [Pharmacy Med Name: FOLIC ACID 1MG TABLETS] 90 tablet 3     Sig: TAKE 1 TABLET BY MOUTH DAILY    pravastatin (Pravachol) 20 MG tablet 90 tablet 3     Sig: Take 1 tablet by mouth Daily. For cholesterol        Pharmacy where request should be sent: [id number] [address] [place] [address] [phone] [facility] [phone]      Last office visit with prescribing clinician: 12/30/2024   Last telemedicine visit with prescribing clinician: Visit date not found   Next office visit with prescribing clinician: 8/28/2025     Additional details provided by patient:       Does the patient have less than a 3 day supply:  [x] Yes  [] No    Would you like a call back once the refill request has been completed: [x] Yes [] No    If the office needs to give you a call back, can they leave a voicemail: [x] Yes [] No    Lizette Vegas Rep   07/25/25 15:29 EDT        "

## 2025-08-28 ENCOUNTER — OFFICE VISIT (OUTPATIENT)
Dept: FAMILY MEDICINE CLINIC | Facility: CLINIC | Age: 60
End: 2025-08-28
Payer: COMMERCIAL

## 2025-08-28 VITALS
OXYGEN SATURATION: 95 % | SYSTOLIC BLOOD PRESSURE: 130 MMHG | HEIGHT: 74 IN | HEART RATE: 72 BPM | TEMPERATURE: 97 F | DIASTOLIC BLOOD PRESSURE: 70 MMHG | BODY MASS INDEX: 37.86 KG/M2 | WEIGHT: 295 LBS | RESPIRATION RATE: 16 BRPM

## 2025-08-28 DIAGNOSIS — Z12.5 SCREENING PSA (PROSTATE SPECIFIC ANTIGEN): ICD-10-CM

## 2025-08-28 DIAGNOSIS — E55.9 VITAMIN D DEFICIENCY: ICD-10-CM

## 2025-08-28 DIAGNOSIS — E53.8 LOW FOLIC ACID: ICD-10-CM

## 2025-08-28 DIAGNOSIS — E55.9 VITAMIN D DEFICIENCY DISEASE: ICD-10-CM

## 2025-08-28 DIAGNOSIS — E53.8 LOW SERUM VITAMIN B12: ICD-10-CM

## 2025-08-28 DIAGNOSIS — F33.42 RECURRENT MAJOR DEPRESSIVE DISORDER, IN FULL REMISSION: ICD-10-CM

## 2025-08-28 DIAGNOSIS — G47.33 OSA ON CPAP: ICD-10-CM

## 2025-08-28 DIAGNOSIS — E66.812 CLASS 2 OBESITY: ICD-10-CM

## 2025-08-28 DIAGNOSIS — E66.01 MORBIDLY OBESE: ICD-10-CM

## 2025-08-28 DIAGNOSIS — R73.01 IMPAIRED FASTING GLUCOSE: ICD-10-CM

## 2025-08-28 DIAGNOSIS — I10 PRIMARY HYPERTENSION: Primary | ICD-10-CM

## 2025-08-28 DIAGNOSIS — E03.9 ACQUIRED HYPOTHYROIDISM: ICD-10-CM

## 2025-08-28 DIAGNOSIS — M79.661 RIGHT CALF PAIN: ICD-10-CM

## 2025-08-28 DIAGNOSIS — E78.2 MIXED HYPERLIPIDEMIA: ICD-10-CM

## 2025-08-28 DIAGNOSIS — N52.9 ERECTILE DYSFUNCTION, UNSPECIFIED ERECTILE DYSFUNCTION TYPE: ICD-10-CM

## 2025-08-28 RX ORDER — SILDENAFIL CITRATE 20 MG/1
TABLET ORAL
Qty: 30 TABLET | Refills: 11 | Status: SHIPPED | OUTPATIENT
Start: 2025-08-28